# Patient Record
Sex: FEMALE | Race: WHITE | Employment: FULL TIME | URBAN - METROPOLITAN AREA
[De-identification: names, ages, dates, MRNs, and addresses within clinical notes are randomized per-mention and may not be internally consistent; named-entity substitution may affect disease eponyms.]

---

## 2018-01-16 ENCOUNTER — HOSPITAL ENCOUNTER (OUTPATIENT)
Dept: GENERAL RADIOLOGY | Age: 48
Discharge: HOME | End: 2018-01-16
Attending: SURGERY
Payer: COMMERCIAL

## 2018-01-16 ENCOUNTER — HOSPITAL ENCOUNTER (OUTPATIENT)
Dept: PREADMISSION TESTING | Age: 48
Discharge: HOME | End: 2018-01-16
Payer: COMMERCIAL

## 2018-01-16 VITALS
TEMPERATURE: 98.2 F | BODY MASS INDEX: 31.28 KG/M2 | OXYGEN SATURATION: 97 % | WEIGHT: 183.2 LBS | SYSTOLIC BLOOD PRESSURE: 127 MMHG | HEIGHT: 64 IN | HEART RATE: 88 BPM | RESPIRATION RATE: 20 BRPM | DIASTOLIC BLOOD PRESSURE: 82 MMHG

## 2018-01-16 LAB
ABO + RH BLD: NORMAL
ALBUMIN SERPL-MCNC: 3.3 G/DL (ref 3.5–5)
ALBUMIN/GLOB SERPL: 0.8 {RATIO} (ref 1.1–2.2)
ALP SERPL-CCNC: 77 U/L (ref 45–117)
ALT SERPL-CCNC: 26 U/L (ref 12–78)
ANION GAP SERPL CALC-SCNC: 6 MMOL/L (ref 5–15)
APPEARANCE UR: ABNORMAL
APTT PPP: 33.1 SEC (ref 22.1–32.5)
AST SERPL-CCNC: 18 U/L (ref 15–37)
BACTERIA URNS QL MICRO: ABNORMAL /HPF
BILIRUB SERPL-MCNC: 0.5 MG/DL (ref 0.2–1)
BILIRUB UR QL: NEGATIVE
BLOOD GROUP ANTIBODIES SERPL: NORMAL
BUN SERPL-MCNC: 14 MG/DL (ref 6–20)
BUN/CREAT SERPL: 14 (ref 12–20)
CALCIUM SERPL-MCNC: 9 MG/DL (ref 8.5–10.1)
CHLORIDE SERPL-SCNC: 98 MMOL/L (ref 97–108)
CO2 SERPL-SCNC: 32 MMOL/L (ref 21–32)
COLOR UR: ABNORMAL
CREAT SERPL-MCNC: 1 MG/DL (ref 0.55–1.02)
EPITH CASTS URNS QL MICRO: ABNORMAL /LPF
ERYTHROCYTE [DISTWIDTH] IN BLOOD BY AUTOMATED COUNT: 12.8 % (ref 11.5–14.5)
GLOBULIN SER CALC-MCNC: 3.9 G/DL (ref 2–4)
GLUCOSE SERPL-MCNC: 91 MG/DL (ref 65–100)
GLUCOSE UR STRIP.AUTO-MCNC: NEGATIVE MG/DL
HCT VFR BLD AUTO: 47.3 % (ref 35–47)
HGB BLD-MCNC: 16.5 G/DL (ref 11.5–16)
HGB UR QL STRIP: NEGATIVE
INR PPP: 1 (ref 0.9–1.1)
KETONES UR QL STRIP.AUTO: NEGATIVE MG/DL
LEUKOCYTE ESTERASE UR QL STRIP.AUTO: NEGATIVE
MCH RBC QN AUTO: 35.3 PG (ref 26–34)
MCHC RBC AUTO-ENTMCNC: 34.9 G/DL (ref 30–36.5)
MCV RBC AUTO: 101.1 FL (ref 80–99)
NITRITE UR QL STRIP.AUTO: NEGATIVE
PH UR STRIP: 7 [PH] (ref 5–8)
PLATELET # BLD AUTO: 256 K/UL (ref 150–400)
POTASSIUM SERPL-SCNC: 2.9 MMOL/L (ref 3.5–5.1)
PROT SERPL-MCNC: 7.2 G/DL (ref 6.4–8.2)
PROT UR STRIP-MCNC: NEGATIVE MG/DL
PROTHROMBIN TIME: 10 SEC (ref 9–11.1)
RBC # BLD AUTO: 4.68 M/UL (ref 3.8–5.2)
RBC #/AREA URNS HPF: ABNORMAL /HPF (ref 0–5)
SODIUM SERPL-SCNC: 136 MMOL/L (ref 136–145)
SP GR UR REFRACTOMETRY: 1.02 (ref 1–1.03)
SPECIMEN EXP DATE BLD: NORMAL
THERAPEUTIC RANGE,PTTT: ABNORMAL SECS (ref 58–77)
UA: UC IF INDICATED,UAUC: ABNORMAL
UROBILINOGEN UR QL STRIP.AUTO: 2 EU/DL (ref 0.2–1)
WBC # BLD AUTO: 10 K/UL (ref 3.6–11)
WBC URNS QL MICRO: ABNORMAL /HPF (ref 0–4)
YEAST URNS QL MICRO: PRESENT

## 2018-01-16 PROCEDURE — 87086 URINE CULTURE/COLONY COUNT: CPT | Performed by: SURGERY

## 2018-01-16 PROCEDURE — 85730 THROMBOPLASTIN TIME PARTIAL: CPT | Performed by: SURGERY

## 2018-01-16 PROCEDURE — 85610 PROTHROMBIN TIME: CPT | Performed by: SURGERY

## 2018-01-16 PROCEDURE — 80053 COMPREHEN METABOLIC PANEL: CPT | Performed by: SURGERY

## 2018-01-16 PROCEDURE — 81001 URINALYSIS AUTO W/SCOPE: CPT | Performed by: SURGERY

## 2018-01-16 PROCEDURE — 93005 ELECTROCARDIOGRAM TRACING: CPT

## 2018-01-16 PROCEDURE — 86850 RBC ANTIBODY SCREEN: CPT | Performed by: SURGERY

## 2018-01-16 PROCEDURE — 71046 X-RAY EXAM CHEST 2 VIEWS: CPT

## 2018-01-16 PROCEDURE — 36415 COLL VENOUS BLD VENIPUNCTURE: CPT | Performed by: SURGERY

## 2018-01-16 PROCEDURE — 85027 COMPLETE CBC AUTOMATED: CPT | Performed by: SURGERY

## 2018-01-16 RX ORDER — CLOPIDOGREL BISULFATE 75 MG/1
75 TABLET ORAL DAILY
COMMUNITY

## 2018-01-16 RX ORDER — CEFAZOLIN SODIUM 1 G/3ML
2 INJECTION, POWDER, FOR SOLUTION INTRAMUSCULAR; INTRAVENOUS ONCE
Status: CANCELLED | OUTPATIENT
Start: 2018-01-22 | End: 2018-01-22

## 2018-01-16 RX ORDER — ATENOLOL AND CHLORTHALIDONE TABLET 100; 25 MG/1; MG/1
1 TABLET ORAL DAILY
COMMUNITY
End: 2018-01-26

## 2018-01-16 NOTE — PERIOP NOTES
[facility]  Preoperative Instructions        Surgery Date 1/22/19          Time of Arrival 0830 Contact #[phone]    1. On the day of your surgery, please report to the Surgical Services Registration Desk and sign in at your designated time. The Surgery Center is located to the right of the Emergency Room. 2. You must have someone with you to drive you home. You should not drive a car for 24 hours following surgery. Please make arrangements for a friend or family member to stay with you for the first 24 hours after your surgery. 3. Do not have anything to eat or drink (including water, gum, mints, coffee, juice) after midnight 1/21/18  . ? This may not apply to medications prescribed by your physician. ?(Please note below the special instructions with medications to take the morning of your procedure.)    4. We recommend you do not drink any alcoholic beverages for 24 hours before and after your surgery. 5. Contact your surgeons office for instructions on the following medications: non-steroidal anti-inflammatory drugs (i.e. Advil, Aleve), vitamins, and supplements. (Some surgeons will want you to stop these medications prior to surgery and others may allow you to take them)  **If you are currently taking Plavix, Coumadin, Aspirin and/or other blood-thinning agents, contact your surgeon for instructions. ** Your surgeon will partner with the physician prescribing these medications to determine if it is safe to stop or if you need to continue taking. Please do not stop taking these medications without instructions from your surgeon    6. Wear comfortable clothes. Wear glasses instead of contacts. Do not bring any money or jewelry. Please bring picture ID, insurance card, and any prearranged co-payment or hospital payment. Do not wear make-up, particularly mascara the morning of your surgery. Do not wear nail polish, particularly if you are having foot /hand surgery.   Wear your hair loose or down, no ponytails, buns, nico pins or clips. All body piercings must be removed. Please shower with antibacterial soap for three consecutive days before and on the morning of surgery, but do not apply any lotions, powders or deodorants after the shower on the day of surgery. Please use a fresh towels after each shower. Please sleep in clean clothes and change bed linens the night before surgery. Please do not shave for 48 hours prior to surgery. Shaving of the face is acceptable. 7. You should understand that if you do not follow these instructions your surgery may be cancelled. If your physical condition changes (I.e. fever, cold or flu) please contact your surgeon as soon as possible. 8. It is important that you be on time. If a situation occurs where you may be late, please call [phone] (OR Holding Area). 9. If you have any questions and or problems, please call [phone] (Pre-admission Testing). 10. Your surgery time may be subject to change. You will receive a phone call the evening prior if your time changes. 11.  If having outpatient surgery, you must have someone to drive you here, stay with you during the duration of your stay, and to drive you home at time of discharge. 12.   In an effort to improve the efficiency, privacy, and safety for all of our Pre-op patients visitors are not allowed in the Holding area. Once you arrive and are registered your family/visitors will be asked to remain in the waiting room. The Pre-op staff will get you from the Surgical Waiting Area and will explain to you and your family/visitors that the Pre-op phase is beginning. The staff will answer any questions and provide instructions for tracking of the patient, by use of the existing tracking number and color-coded status board in the waiting room.   At this time the staff will also ask for your designated spokesperson information in the event that the physician or staff need to provide an update or obtain any pertinent information. The designated spokesperson will be notified if the physician needs to speak to family during the pre-operative phase. If at any time your family/visitors has questions or concerns they may approach the volunteer desk in the waiting area for assistance. Special Instructions: none     MEDICATIONS TO TAKE THE MORNING OF SURGERY WITH A SIP OF WATER: none       I understand a pre-operative phone call will be made to verify my surgery time. In the event that I am not available, I give permission for a message to be left on my answering service and/or with another person?   yes       ___________________      __________   _________    (Signature of Patient)             (Witness)                (Date and Time)

## 2018-01-16 NOTE — PERIOP NOTES
1744Called Dr. Tete Resendez office regarding Low K ( 2.9) waiting a return call from Dr. Yandel Butterfield. [address] Yandel Butterfield called back and stated Bo Bell NP could deal with the low K tomorrow in the AM.     Bo Bell NP made aware by RADHA Jones RN

## 2018-01-16 NOTE — PERIOP NOTES
Incentive Spirometer        Using the incentive spirometer helps expand the small air sacs of your lungs, helps you breathe deeply, and helps improve your lung function. Use your incentive spirometer twice a day (10 breaths each time) prior to surgery. How to Use Your Incentive Spirometer:  1. Hold the incentive spirometer in an upright position. 2. Breathe out as usual.   3. Place the mouthpiece in your mouth and seal your lips tightly around it. 4. Take a deep breath. Breathe in slowly and as deeply as possible. Keep the blue flow rate guide between the arrows. 5. Hold your breath as long as possible. Then exhale slowly and allow the piston to fall to the bottom of the column. 6. Rest for a few seconds and repeat steps one through five at least 10 times. PAT Tidal Volume_______2500___________  x______5__________  Date______1/16/18_________________    Alena Reyes THE INCENTIVE SPIROMETER WITH YOU TO THE HOSPITAL ON THE DAY OF YOUR SURGERY. Opportunity given to ask and answer questions as well as to observe return demonstration.     Patient signature_____________________________    Witness____________________________

## 2018-01-17 LAB
ATRIAL RATE: 88 BPM
CALCULATED P AXIS, ECG09: 50 DEGREES
CALCULATED R AXIS, ECG10: -66 DEGREES
CALCULATED T AXIS, ECG11: 48 DEGREES
DIAGNOSIS, 93000: NORMAL
P-R INTERVAL, ECG05: 124 MS
Q-T INTERVAL, ECG07: 374 MS
QRS DURATION, ECG06: 88 MS
QTC CALCULATION (BEZET), ECG08: 452 MS
VENTRICULAR RATE, ECG03: 88 BPM

## 2018-01-17 RX ORDER — POTASSIUM CHLORIDE 750 MG/1
20 TABLET, FILM COATED, EXTENDED RELEASE ORAL 2 TIMES DAILY
COMMUNITY
Start: 2018-01-17

## 2018-01-17 NOTE — ADVANCED PRACTICE NURSE
Results from PAT assessment faxed to PCP office with instructions to call with treatment plan for pt and date that K will be rechecked prior to surgery.

## 2018-01-17 NOTE — ADVANCED PRACTICE NURSE
PC from [facility], nurse for Dr. Shanna Pham who states pt will be prescribed KCL 20 mEq BID and recheck her potassium prior to surgery. Will fax results to PAT as soon as available. Chastity to contact pt directly regarding results and need for tx.

## 2018-01-17 NOTE — ADVANCED PRACTICE NURSE
PC from [facility], nurse for Dr. Luisa Mcdermott requesting K+ be rechecked on DOS as pt is just starting potassium supplementation today and will not be able to have repeat K+ level and results prior to pt's surgery on Monday. Will recheck POC chem 8 DOS.

## 2018-01-17 NOTE — ADVANCED PRACTICE NURSE
PC to pt regarding K+ and need for FU with PCP after discharge from hospital as surgeon will not be following her maintenance medications. Pt verbalized understanding and states she will make appt for FU with PCP after DC. Pt had questions regarding Plavix which was started on Monday by Dr. Cabrera Sun. Referred pt to Dr. Regina Fuentes office for directions regarding Plavix. Pt states she will call office. VM left for Ezra, nurse for Dr. Cabrera Sun regarding K+ level, tx as well as pt's concerns regarding Plavix therapy.

## 2018-01-18 LAB
BACTERIA SPEC CULT: NORMAL
CC UR VC: NORMAL
SERVICE CMNT-IMP: NORMAL

## 2018-01-22 ENCOUNTER — ANESTHESIA EVENT (OUTPATIENT)
Dept: SURGERY | Age: 48
DRG: 253 | End: 2018-01-22
Payer: COMMERCIAL

## 2018-01-22 ENCOUNTER — HOSPITAL ENCOUNTER (INPATIENT)
Age: 48
LOS: 4 days | Discharge: HOME | DRG: 253 | End: 2018-01-26
Attending: SURGERY | Admitting: SURGERY
Payer: COMMERCIAL

## 2018-01-22 ENCOUNTER — ANESTHESIA (OUTPATIENT)
Dept: SURGERY | Age: 48
DRG: 253 | End: 2018-01-22
Payer: COMMERCIAL

## 2018-01-22 DIAGNOSIS — I73.9 PVD (PERIPHERAL VASCULAR DISEASE) (HCC): Primary | ICD-10-CM

## 2018-01-22 LAB
ANION GAP BLD CALC-SCNC: 18 MMOL/L (ref 5–15)
BUN BLD-MCNC: 12 MG/DL (ref 9–20)
CA-I BLD-MCNC: 1.11 MMOL/L (ref 1.12–1.32)
CHLORIDE BLD-SCNC: 98 MMOL/L (ref 98–107)
CO2 BLD-SCNC: 26 MMOL/L (ref 21–32)
CREAT BLD-MCNC: 0.9 MG/DL (ref 0.6–1.3)
GLUCOSE BLD-MCNC: 106 MG/DL (ref 65–100)
HCG UR QL: NEGATIVE
HCT VFR BLD CALC: 47 % (ref 35–47)
HGB BLD-MCNC: 16 GM/DL (ref 11.5–16)
POTASSIUM BLD-SCNC: 3.2 MMOL/L (ref 3.5–5.1)
SERVICE CMNT-IMP: ABNORMAL
SODIUM BLD-SCNC: 137 MMOL/L (ref 136–145)

## 2018-01-22 PROCEDURE — 77030002986 HC SUT PROL J&J -A: Performed by: SURGERY

## 2018-01-22 PROCEDURE — 74011250636 HC RX REV CODE- 250/636: Performed by: ANESTHESIOLOGY

## 2018-01-22 PROCEDURE — 76060000034 HC ANESTHESIA 1.5 TO 2 HR: Performed by: SURGERY

## 2018-01-22 PROCEDURE — 74011250637 HC RX REV CODE- 250/637: Performed by: SURGERY

## 2018-01-22 PROCEDURE — 65660000000 HC RM CCU STEPDOWN

## 2018-01-22 PROCEDURE — 74011000272 HC RX REV CODE- 272: Performed by: SURGERY

## 2018-01-22 PROCEDURE — 74011250636 HC RX REV CODE- 250/636

## 2018-01-22 PROCEDURE — 81025 URINE PREGNANCY TEST: CPT

## 2018-01-22 PROCEDURE — 77030031139 HC SUT VCRL2 J&J -A: Performed by: SURGERY

## 2018-01-22 PROCEDURE — 74011250636 HC RX REV CODE- 250/636: Performed by: SURGERY

## 2018-01-22 PROCEDURE — 77030026438 HC STYL ET INTUB CARD -A: Performed by: NURSE ANESTHETIST, CERTIFIED REGISTERED

## 2018-01-22 PROCEDURE — 76010000162 HC OR TIME 1.5 TO 2 HR INTENSV-TIER 1: Performed by: SURGERY

## 2018-01-22 PROCEDURE — 74011000250 HC RX REV CODE- 250

## 2018-01-22 PROCEDURE — 77030019908 HC STETH ESOPH SIMS -A: Performed by: NURSE ANESTHETIST, CERTIFIED REGISTERED

## 2018-01-22 PROCEDURE — 041L0JL BYPASS LEFT FEMORAL ARTERY TO POPLITEAL ARTERY WITH SYNTHETIC SUBSTITUTE, OPEN APPROACH: ICD-10-PCS | Performed by: SURGERY

## 2018-01-22 PROCEDURE — 80047 BASIC METABLC PNL IONIZED CA: CPT

## 2018-01-22 PROCEDURE — 77030020256 HC SOL INJ NACL 0.9%  500ML: Performed by: SURGERY

## 2018-01-22 PROCEDURE — 76210000010 HC REC RM PH I 7 TO 7.5 HR: Performed by: SURGERY

## 2018-01-22 PROCEDURE — 74011000250 HC RX REV CODE- 250: Performed by: SURGERY

## 2018-01-22 PROCEDURE — 77030002916 HC SUT ETHLN J&J -A: Performed by: SURGERY

## 2018-01-22 PROCEDURE — 77030010516 HC APPL HEMA CLP TELE -B: Performed by: SURGERY

## 2018-01-22 PROCEDURE — 77030011640 HC PAD GRND REM COVD -A: Performed by: SURGERY

## 2018-01-22 PROCEDURE — 77030008684 HC TU ET CUF COVD -B: Performed by: NURSE ANESTHETIST, CERTIFIED REGISTERED

## 2018-01-22 PROCEDURE — 77030020782 HC GWN BAIR PAWS FLX 3M -B

## 2018-01-22 PROCEDURE — 74011258636 HC RX REV CODE- 258/636: Performed by: SURGERY

## 2018-01-22 PROCEDURE — 77030034849: Performed by: SURGERY

## 2018-01-22 PROCEDURE — 77030018836 HC SOL IRR NACL ICUM -A: Performed by: SURGERY

## 2018-01-22 PROCEDURE — C1768 GRAFT, VASCULAR: HCPCS | Performed by: SURGERY

## 2018-01-22 PROCEDURE — 77030002924 HC SUT GORTX WLGO -B: Performed by: SURGERY

## 2018-01-22 RX ORDER — ROCURONIUM BROMIDE 10 MG/ML
INJECTION, SOLUTION INTRAVENOUS AS NEEDED
Status: DISCONTINUED | OUTPATIENT
Start: 2018-01-22 | End: 2018-01-22 | Stop reason: HOSPADM

## 2018-01-22 RX ORDER — ONDANSETRON 2 MG/ML
4 INJECTION INTRAMUSCULAR; INTRAVENOUS AS NEEDED
Status: DISCONTINUED | OUTPATIENT
Start: 2018-01-22 | End: 2018-01-22 | Stop reason: HOSPADM

## 2018-01-22 RX ORDER — CLOPIDOGREL BISULFATE 75 MG/1
75 TABLET ORAL DAILY
Status: DISCONTINUED | OUTPATIENT
Start: 2018-01-23 | End: 2018-01-23

## 2018-01-22 RX ORDER — PROPOFOL 10 MG/ML
INJECTION, EMULSION INTRAVENOUS AS NEEDED
Status: DISCONTINUED | OUTPATIENT
Start: 2018-01-22 | End: 2018-01-22 | Stop reason: HOSPADM

## 2018-01-22 RX ORDER — CEFAZOLIN SODIUM/WATER 2 G/20 ML
2 SYRINGE (ML) INTRAVENOUS EVERY 8 HOURS
Status: COMPLETED | OUTPATIENT
Start: 2018-01-22 | End: 2018-01-23

## 2018-01-22 RX ORDER — FENTANYL CITRATE 50 UG/ML
50 INJECTION, SOLUTION INTRAMUSCULAR; INTRAVENOUS AS NEEDED
Status: DISCONTINUED | OUTPATIENT
Start: 2018-01-22 | End: 2018-01-22 | Stop reason: HOSPADM

## 2018-01-22 RX ORDER — SUCCINYLCHOLINE CHLORIDE 20 MG/ML
INJECTION INTRAMUSCULAR; INTRAVENOUS AS NEEDED
Status: DISCONTINUED | OUTPATIENT
Start: 2018-01-22 | End: 2018-01-22 | Stop reason: HOSPADM

## 2018-01-22 RX ORDER — DEXTROSE, SODIUM CHLORIDE, SODIUM LACTATE, POTASSIUM CHLORIDE, AND CALCIUM CHLORIDE 5; .6; .31; .03; .02 G/100ML; G/100ML; G/100ML; G/100ML; G/100ML
125 INJECTION, SOLUTION INTRAVENOUS CONTINUOUS
Status: DISCONTINUED | OUTPATIENT
Start: 2018-01-22 | End: 2018-01-22

## 2018-01-22 RX ORDER — OXYCODONE AND ACETAMINOPHEN 5; 325 MG/1; MG/1
1 TABLET ORAL AS NEEDED
Status: DISCONTINUED | OUTPATIENT
Start: 2018-01-22 | End: 2018-01-22 | Stop reason: HOSPADM

## 2018-01-22 RX ORDER — MORPHINE SULFATE 10 MG/ML
2 INJECTION, SOLUTION INTRAMUSCULAR; INTRAVENOUS
Status: DISCONTINUED | OUTPATIENT
Start: 2018-01-22 | End: 2018-01-22 | Stop reason: HOSPADM

## 2018-01-22 RX ORDER — ONDANSETRON 2 MG/ML
INJECTION INTRAMUSCULAR; INTRAVENOUS AS NEEDED
Status: DISCONTINUED | OUTPATIENT
Start: 2018-01-22 | End: 2018-01-22 | Stop reason: HOSPADM

## 2018-01-22 RX ORDER — ENOXAPARIN SODIUM 100 MG/ML
40 INJECTION SUBCUTANEOUS EVERY 24 HOURS
Status: DISCONTINUED | OUTPATIENT
Start: 2018-01-22 | End: 2018-01-23

## 2018-01-22 RX ORDER — HYDROMORPHONE HYDROCHLORIDE 2 MG/ML
INJECTION, SOLUTION INTRAMUSCULAR; INTRAVENOUS; SUBCUTANEOUS AS NEEDED
Status: DISCONTINUED | OUTPATIENT
Start: 2018-01-22 | End: 2018-01-22 | Stop reason: HOSPADM

## 2018-01-22 RX ORDER — THROMBIN, TOPICAL (BOVINE) 20000 UNIT
KIT TOPICAL AS NEEDED
Status: DISCONTINUED | OUTPATIENT
Start: 2018-01-22 | End: 2018-01-22 | Stop reason: HOSPADM

## 2018-01-22 RX ORDER — SODIUM CHLORIDE 0.9 % (FLUSH) 0.9 %
5-10 SYRINGE (ML) INJECTION AS NEEDED
Status: DISCONTINUED | OUTPATIENT
Start: 2018-01-22 | End: 2018-01-22 | Stop reason: HOSPADM

## 2018-01-22 RX ORDER — SODIUM CHLORIDE, SODIUM LACTATE, POTASSIUM CHLORIDE, CALCIUM CHLORIDE 600; 310; 30; 20 MG/100ML; MG/100ML; MG/100ML; MG/100ML
25 INJECTION, SOLUTION INTRAVENOUS CONTINUOUS
Status: DISCONTINUED | OUTPATIENT
Start: 2018-01-22 | End: 2018-01-23

## 2018-01-22 RX ORDER — FENTANYL CITRATE 50 UG/ML
25 INJECTION, SOLUTION INTRAMUSCULAR; INTRAVENOUS
Status: COMPLETED | OUTPATIENT
Start: 2018-01-22 | End: 2018-01-22

## 2018-01-22 RX ORDER — DIPHENHYDRAMINE HYDROCHLORIDE 50 MG/ML
12.5 INJECTION, SOLUTION INTRAMUSCULAR; INTRAVENOUS AS NEEDED
Status: DISCONTINUED | OUTPATIENT
Start: 2018-01-22 | End: 2018-01-22 | Stop reason: HOSPADM

## 2018-01-22 RX ORDER — SODIUM CHLORIDE 0.9 % (FLUSH) 0.9 %
5-10 SYRINGE (ML) INJECTION EVERY 8 HOURS
Status: DISCONTINUED | OUTPATIENT
Start: 2018-01-22 | End: 2018-01-22 | Stop reason: HOSPADM

## 2018-01-22 RX ORDER — ONDANSETRON 2 MG/ML
4 INJECTION INTRAMUSCULAR; INTRAVENOUS
Status: DISCONTINUED | OUTPATIENT
Start: 2018-01-22 | End: 2018-01-25

## 2018-01-22 RX ORDER — SODIUM CHLORIDE 9 MG/ML
25 INJECTION, SOLUTION INTRAVENOUS CONTINUOUS
Status: DISCONTINUED | OUTPATIENT
Start: 2018-01-22 | End: 2018-01-22 | Stop reason: HOSPADM

## 2018-01-22 RX ORDER — SODIUM CHLORIDE, SODIUM LACTATE, POTASSIUM CHLORIDE, CALCIUM CHLORIDE 600; 310; 30; 20 MG/100ML; MG/100ML; MG/100ML; MG/100ML
INJECTION, SOLUTION INTRAVENOUS
Status: DISCONTINUED | OUTPATIENT
Start: 2018-01-22 | End: 2018-01-22 | Stop reason: HOSPADM

## 2018-01-22 RX ORDER — CILOSTAZOL 100 MG/1
100 TABLET ORAL EVERY 12 HOURS
Status: DISCONTINUED | OUTPATIENT
Start: 2018-01-22 | End: 2018-01-23

## 2018-01-22 RX ORDER — MIDAZOLAM HYDROCHLORIDE 1 MG/ML
0.5 INJECTION, SOLUTION INTRAMUSCULAR; INTRAVENOUS
Status: DISCONTINUED | OUTPATIENT
Start: 2018-01-22 | End: 2018-01-22 | Stop reason: HOSPADM

## 2018-01-22 RX ORDER — METOPROLOL TARTRATE 5 MG/5ML
INJECTION INTRAVENOUS AS NEEDED
Status: DISCONTINUED | OUTPATIENT
Start: 2018-01-22 | End: 2018-01-22 | Stop reason: HOSPADM

## 2018-01-22 RX ORDER — DEXTROSE, SODIUM CHLORIDE, SODIUM LACTATE, POTASSIUM CHLORIDE, AND CALCIUM CHLORIDE 5; .6; .31; .03; .02 G/100ML; G/100ML; G/100ML; G/100ML; G/100ML
75 INJECTION, SOLUTION INTRAVENOUS CONTINUOUS
Status: DISCONTINUED | OUTPATIENT
Start: 2018-01-22 | End: 2018-01-23

## 2018-01-22 RX ORDER — CEFAZOLIN SODIUM/WATER 2 G/20 ML
2 SYRINGE (ML) INTRAVENOUS ONCE
Status: DISCONTINUED | OUTPATIENT
Start: 2018-01-22 | End: 2018-01-22 | Stop reason: HOSPADM

## 2018-01-22 RX ORDER — CEFAZOLIN SODIUM IN 0.9 % NACL 2 G/100 ML
PLASTIC BAG, INJECTION (ML) INTRAVENOUS AS NEEDED
Status: DISCONTINUED | OUTPATIENT
Start: 2018-01-22 | End: 2018-01-22 | Stop reason: HOSPADM

## 2018-01-22 RX ORDER — SODIUM CHLORIDE 0.9 % (FLUSH) 0.9 %
SYRINGE (ML) INJECTION
Status: COMPLETED
Start: 2018-01-22 | End: 2018-01-22

## 2018-01-22 RX ORDER — FENTANYL CITRATE 50 UG/ML
INJECTION, SOLUTION INTRAMUSCULAR; INTRAVENOUS AS NEEDED
Status: DISCONTINUED | OUTPATIENT
Start: 2018-01-22 | End: 2018-01-22 | Stop reason: HOSPADM

## 2018-01-22 RX ORDER — NEOSTIGMINE METHYLSULFATE 1 MG/ML
INJECTION INTRAVENOUS AS NEEDED
Status: DISCONTINUED | OUTPATIENT
Start: 2018-01-22 | End: 2018-01-22 | Stop reason: HOSPADM

## 2018-01-22 RX ORDER — HYDROMORPHONE HYDROCHLORIDE 2 MG/ML
0.2 INJECTION, SOLUTION INTRAMUSCULAR; INTRAVENOUS; SUBCUTANEOUS
Status: COMPLETED | OUTPATIENT
Start: 2018-01-22 | End: 2018-01-22

## 2018-01-22 RX ORDER — SODIUM CHLORIDE, SODIUM LACTATE, POTASSIUM CHLORIDE, CALCIUM CHLORIDE 600; 310; 30; 20 MG/100ML; MG/100ML; MG/100ML; MG/100ML
125 INJECTION, SOLUTION INTRAVENOUS CONTINUOUS
Status: DISCONTINUED | OUTPATIENT
Start: 2018-01-22 | End: 2018-01-22

## 2018-01-22 RX ORDER — HEPARIN SODIUM 5000 [USP'U]/ML
INJECTION, SOLUTION INTRAVENOUS; SUBCUTANEOUS AS NEEDED
Status: DISCONTINUED | OUTPATIENT
Start: 2018-01-22 | End: 2018-01-22 | Stop reason: HOSPADM

## 2018-01-22 RX ORDER — DEXAMETHASONE SODIUM PHOSPHATE 4 MG/ML
INJECTION, SOLUTION INTRA-ARTICULAR; INTRALESIONAL; INTRAMUSCULAR; INTRAVENOUS; SOFT TISSUE AS NEEDED
Status: DISCONTINUED | OUTPATIENT
Start: 2018-01-22 | End: 2018-01-22 | Stop reason: HOSPADM

## 2018-01-22 RX ORDER — FENTANYL CITRATE 50 UG/ML
INJECTION, SOLUTION INTRAMUSCULAR; INTRAVENOUS
Status: COMPLETED
Start: 2018-01-22 | End: 2018-01-22

## 2018-01-22 RX ORDER — SODIUM CHLORIDE, SODIUM LACTATE, POTASSIUM CHLORIDE, CALCIUM CHLORIDE 600; 310; 30; 20 MG/100ML; MG/100ML; MG/100ML; MG/100ML
125 INJECTION, SOLUTION INTRAVENOUS CONTINUOUS
Status: DISCONTINUED | OUTPATIENT
Start: 2018-01-22 | End: 2018-01-22 | Stop reason: HOSPADM

## 2018-01-22 RX ORDER — GLYCOPYRROLATE 0.2 MG/ML
INJECTION INTRAMUSCULAR; INTRAVENOUS AS NEEDED
Status: DISCONTINUED | OUTPATIENT
Start: 2018-01-22 | End: 2018-01-22 | Stop reason: HOSPADM

## 2018-01-22 RX ORDER — PROTAMINE SULFATE 10 MG/ML
10 INJECTION, SOLUTION INTRAVENOUS
Status: COMPLETED | OUTPATIENT
Start: 2018-01-22 | End: 2018-01-22

## 2018-01-22 RX ORDER — MIDAZOLAM HYDROCHLORIDE 1 MG/ML
1 INJECTION, SOLUTION INTRAMUSCULAR; INTRAVENOUS AS NEEDED
Status: DISCONTINUED | OUTPATIENT
Start: 2018-01-22 | End: 2018-01-22 | Stop reason: HOSPADM

## 2018-01-22 RX ORDER — LIDOCAINE HYDROCHLORIDE 10 MG/ML
0.1 INJECTION, SOLUTION EPIDURAL; INFILTRATION; INTRACAUDAL; PERINEURAL AS NEEDED
Status: DISCONTINUED | OUTPATIENT
Start: 2018-01-22 | End: 2018-01-22 | Stop reason: HOSPADM

## 2018-01-22 RX ORDER — MIDAZOLAM HYDROCHLORIDE 1 MG/ML
INJECTION, SOLUTION INTRAMUSCULAR; INTRAVENOUS AS NEEDED
Status: DISCONTINUED | OUTPATIENT
Start: 2018-01-22 | End: 2018-01-22 | Stop reason: HOSPADM

## 2018-01-22 RX ORDER — MORPHINE SULFATE IN 0.9 % NACL 150MG/30ML
PATIENT CONTROLLED ANALGESIA SYRINGE INTRAVENOUS
Status: DISCONTINUED | OUTPATIENT
Start: 2018-01-22 | End: 2018-01-26 | Stop reason: HOSPADM

## 2018-01-22 RX ORDER — LIDOCAINE HYDROCHLORIDE 20 MG/ML
INJECTION, SOLUTION EPIDURAL; INFILTRATION; INTRACAUDAL; PERINEURAL AS NEEDED
Status: DISCONTINUED | OUTPATIENT
Start: 2018-01-22 | End: 2018-01-22 | Stop reason: HOSPADM

## 2018-01-22 RX ADMIN — SODIUM CHLORIDE, SODIUM LACTATE, POTASSIUM CHLORIDE, AND CALCIUM CHLORIDE 25 ML/HR: 600; 310; 30; 20 INJECTION, SOLUTION INTRAVENOUS at 09:45

## 2018-01-22 RX ADMIN — METOPROLOL TARTRATE 2 MG: 5 INJECTION INTRAVENOUS at 12:30

## 2018-01-22 RX ADMIN — HYDROMORPHONE HYDROCHLORIDE 0.5 MG: 2 INJECTION, SOLUTION INTRAMUSCULAR; INTRAVENOUS; SUBCUTANEOUS at 11:46

## 2018-01-22 RX ADMIN — FENTANYL CITRATE 25 MCG: 50 INJECTION, SOLUTION INTRAMUSCULAR; INTRAVENOUS at 12:50

## 2018-01-22 RX ADMIN — MORPHINE SULFATE 2 MG: 10 INJECTION INTRAMUSCULAR; INTRAVENOUS; SUBCUTANEOUS at 19:00

## 2018-01-22 RX ADMIN — FENTANYL CITRATE 50 MCG: 50 INJECTION, SOLUTION INTRAMUSCULAR; INTRAVENOUS at 10:54

## 2018-01-22 RX ADMIN — HYDROMORPHONE HYDROCHLORIDE 0.2 MG: 2 INJECTION, SOLUTION INTRAMUSCULAR; INTRAVENOUS; SUBCUTANEOUS at 14:00

## 2018-01-22 RX ADMIN — Medication 10 ML: at 14:00

## 2018-01-22 RX ADMIN — SUCCINYLCHOLINE CHLORIDE 140 MG: 20 INJECTION INTRAMUSCULAR; INTRAVENOUS at 10:47

## 2018-01-22 RX ADMIN — ONDANSETRON 4 MG: 2 INJECTION INTRAMUSCULAR; INTRAVENOUS at 12:11

## 2018-01-22 RX ADMIN — SODIUM CHLORIDE, SODIUM LACTATE, POTASSIUM CHLORIDE, CALCIUM CHLORIDE, AND DEXTROSE MONOHYDRATE 75 ML/HR: 600; 310; 30; 20; 5 INJECTION, SOLUTION INTRAVENOUS at 14:35

## 2018-01-22 RX ADMIN — PROPOFOL 50 MG: 10 INJECTION, EMULSION INTRAVENOUS at 10:51

## 2018-01-22 RX ADMIN — FENTANYL CITRATE 100 MCG: 50 INJECTION, SOLUTION INTRAMUSCULAR; INTRAVENOUS at 10:47

## 2018-01-22 RX ADMIN — Medication 2 G: at 19:07

## 2018-01-22 RX ADMIN — FENTANYL CITRATE 25 MCG: 50 INJECTION, SOLUTION INTRAMUSCULAR; INTRAVENOUS at 12:55

## 2018-01-22 RX ADMIN — METOPROLOL TARTRATE 1 MG: 5 INJECTION INTRAVENOUS at 11:19

## 2018-01-22 RX ADMIN — METOPROLOL TARTRATE 1 MG: 5 INJECTION INTRAVENOUS at 11:09

## 2018-01-22 RX ADMIN — LIDOCAINE HYDROCHLORIDE 80 MG: 20 INJECTION, SOLUTION EPIDURAL; INFILTRATION; INTRACAUDAL; PERINEURAL at 10:47

## 2018-01-22 RX ADMIN — Medication 2 G: at 10:52

## 2018-01-22 RX ADMIN — Medication: at 20:13

## 2018-01-22 RX ADMIN — SODIUM CHLORIDE, SODIUM LACTATE, POTASSIUM CHLORIDE, AND CALCIUM CHLORIDE 125 ML/HR: 600; 310; 30; 20 INJECTION, SOLUTION INTRAVENOUS at 14:05

## 2018-01-22 RX ADMIN — NITROGLYCERIN 1 INCH: 20 OINTMENT TOPICAL at 19:06

## 2018-01-22 RX ADMIN — FENTANYL CITRATE 25 MCG: 50 INJECTION, SOLUTION INTRAMUSCULAR; INTRAVENOUS at 12:45

## 2018-01-22 RX ADMIN — MIDAZOLAM HYDROCHLORIDE 2 MG: 1 INJECTION, SOLUTION INTRAMUSCULAR; INTRAVENOUS at 10:42

## 2018-01-22 RX ADMIN — GLYCOPYRROLATE 0.5 MG: 0.2 INJECTION INTRAMUSCULAR; INTRAVENOUS at 12:16

## 2018-01-22 RX ADMIN — SODIUM CHLORIDE, SODIUM LACTATE, POTASSIUM CHLORIDE, CALCIUM CHLORIDE: 600; 310; 30; 20 INJECTION, SOLUTION INTRAVENOUS at 10:52

## 2018-01-22 RX ADMIN — FENTANYL CITRATE 25 MCG: 50 INJECTION, SOLUTION INTRAMUSCULAR; INTRAVENOUS at 13:00

## 2018-01-22 RX ADMIN — HEPARIN SODIUM 5000 UNITS: 5000 INJECTION, SOLUTION INTRAVENOUS; SUBCUTANEOUS at 11:23

## 2018-01-22 RX ADMIN — DEXAMETHASONE SODIUM PHOSPHATE 8 MG: 4 INJECTION, SOLUTION INTRA-ARTICULAR; INTRALESIONAL; INTRAMUSCULAR; INTRAVENOUS; SOFT TISSUE at 11:00

## 2018-01-22 RX ADMIN — HYDROMORPHONE HYDROCHLORIDE 0.2 MG: 2 INJECTION, SOLUTION INTRAMUSCULAR; INTRAVENOUS; SUBCUTANEOUS at 14:15

## 2018-01-22 RX ADMIN — ROCURONIUM BROMIDE 5 MG: 10 INJECTION, SOLUTION INTRAVENOUS at 10:47

## 2018-01-22 RX ADMIN — SODIUM CHLORIDE, SODIUM LACTATE, POTASSIUM CHLORIDE, CALCIUM CHLORIDE, AND DEXTROSE MONOHYDRATE 75 ML/HR: 600; 310; 30; 20; 5 INJECTION, SOLUTION INTRAVENOUS at 20:11

## 2018-01-22 RX ADMIN — Medication: at 19:07

## 2018-01-22 RX ADMIN — NEOSTIGMINE METHYLSULFATE 3 MG: 1 INJECTION INTRAVENOUS at 12:16

## 2018-01-22 RX ADMIN — FENTANYL CITRATE 50 MCG: 50 INJECTION, SOLUTION INTRAMUSCULAR; INTRAVENOUS at 11:09

## 2018-01-22 RX ADMIN — ENOXAPARIN SODIUM 40 MG: 40 INJECTION SUBCUTANEOUS at 21:32

## 2018-01-22 RX ADMIN — NITROGLYCERIN 1 INCH: 20 OINTMENT TOPICAL at 23:13

## 2018-01-22 RX ADMIN — FENTANYL CITRATE 50 MCG: 50 INJECTION, SOLUTION INTRAMUSCULAR; INTRAVENOUS at 11:06

## 2018-01-22 RX ADMIN — PROPOFOL 160 MG: 10 INJECTION, EMULSION INTRAVENOUS at 10:47

## 2018-01-22 RX ADMIN — PROTAMINE SULFATE 10 MG: 10 INJECTION, SOLUTION INTRAVENOUS at 13:27

## 2018-01-22 NOTE — PERIOP NOTES
isak- right and left temporal pulse palp. Smile and tongue pertrussion symmetrical, right and left radial pulse palp.  grasp strong and equal, brisk cap refill, right and left dp and pt pulse palp. Strong foot pushes brisk cap refill . Pt alert and oriented.

## 2018-01-22 NOTE — ANESTHESIA POSTPROCEDURE EVALUATION
Post-Anesthesia Evaluation and Assessment    Patient: Bj Lopez MRN: [id number]  SSN: [id number]    YOB: 1970  Age: 56 y.o. Sex: female       Cardiovascular Function/Vital Signs  Visit Vitals    BP (!) 151/102    Pulse (!) 101    Temp 37 °C (98.6 °F)    Resp 14    Ht 5' 4\" (1.626 m)    Wt 81.3 kg (179 lb 3.7 oz)    SpO2 98%    BMI 30.77 kg/m2       Patient is status post general anesthesia for Procedure(s):  LEFT FEMORAL-POPLITEAL BYPASS PTFE . Nausea/Vomiting: None    Postoperative hydration reviewed and adequate. Pain:  Pain Scale 1: (P) Numeric (0 - 10) (01/22/18 1230)  Pain Intensity 1: (P) 1 (01/22/18 1230)   Managed    Neurological Status:   Neuro (WDL): (P) Within Defined Limits (01/22/18 1230)   At baseline    Mental Status and Level of Consciousness: Arousable    Pulmonary Status:   O2 Device: (P) Nasal cannula (01/22/18 1230)   Adequate oxygenation and airway patent    Complications related to anesthesia: None    Post-anesthesia assessment completed.  No concerns    Signed By: Hoang Madison DO     January 22, 2018

## 2018-01-22 NOTE — PERIOP NOTES
Dr. Carlos Davies called to clarify which IVF he would like infusing on patient and updated on left groin bleeding. Dr. Mendoza Sayfaisal ordered LR at 125mL/hr which is currently infusing in the PACU. Physician made aware of very scant bleeding on 4x4 from left groin incision, bleeding has nearly stopped, vital signs, pain, and IVF currently infusing. Dr. Carlos Davies requesting D5LR at 75mL/hr.

## 2018-01-22 NOTE — OP NOTES
[facility] OP NOTE    Irene Castro  MR#: [id number]  : 1970  ACCOUNT #: [de-identified]   DATE OF SERVICE: 2018    PREOPERATIVE DIAGNOSIS:  Peripheral vascular disease. POSTOPERATIVE DIAGNOSIS:  Peripheral vascular disease. OPERATIONS:  Left femoral to above knee popliteal bypass graft utilizing 6 mm thin wall PTFE. SURGEON:  Jed Vasquez. ANESTHESIA:  General.     ESTIMATED BLOOD LOSS:  50 mL. IMPLANTS:  PTFE graft. SPECIMENS:  None. COMPLICATIONS:  None. PROCEDURE:  With the patient supine on the operating table, general anesthesia was induced and the left leg prepared as a sterile field. A skin incision was made over the thigh distally and medially where the above-knee popliteal artery was identified and isolated. It was small but patent. An oblique incision was then made in the groin where the common femoral and along its branches were identified and isolated. The patient had previously had a stent placed in the proximal profunda extending into the common femoral.  I could feel this through the artery. There was a lot of inflammation in the groin. The patient was systemically heparinized. The popliteal artery was occluded and opened longitudinally. A 6 mm PTFE graft was selected and anastomosed end-to-side the popliteal with a running simple suture of 5-0 PTFE. Release of the vascular occlusion clamps resulted in excellent backbleeding. The graft was heparinized. The graft was brought through a subcutaneous tunnel to lie in the groin. Here, the distal external iliac and the distal common femoral were occluded and an arteriotomy was made. The stent was not manipulated. The graft was appropriately fashioned and anastomosed end-to-side to this arteriotomy with a running simple suture of 5-0 PTFE. Release of the vascular occlusion clamps resulted in excellent pulse and Doppler signal in the system. Heparinization was not reversed. The wounds were examined for hemostasis, which was considered adequate. The subcutaneous tissue closed with 2-0 Vicryl and skin with subcuticular suture of 3-0 Vicryl. Flexible collodion was applied at the patient returned to recovery in guarded condition.       MD BRAN Watson / [place]  D: 01/22/2018 12:49     T: 01/22/2018 15:37  JOB #: [id number]  CC: Praneeth Laws MD  CC: Barbie Guerrero MD

## 2018-01-22 NOTE — PERIOP NOTES
Pt with small amount of sang. Bleeding laterally and the femoral site. Dr. Paige Waddell at bedside to assess at [phone], orders for Protamine Sulfate 46SY now and application of ice above the knee and below the femoral site. Pharmacy sent a message for Protamine and called to confirm recepeit of order. Pharmacy stating they will tube the medication.

## 2018-01-22 NOTE — PERIOP NOTES
Pt tolerated ginger ale, crackers, an peanut butter without problems. VSS, pain now controlled, denies problems or n/v.  Pt's boyfriend, Myrna Robert, updated again and informed how well [address] [place] [address] is doing and that we are awaiting PCU bed assignment. Pt and Jaziel both verbalizing an understanding. All questions answered.

## 2018-01-22 NOTE — PERIOP NOTES
Dr. Gonzalez Speak aware of k reading 3.2 no orders given. Spoke with room nurse  Whether to apply mepilex sacrum border she said to apply it. Applied and documented.

## 2018-01-22 NOTE — ANESTHESIA PREPROCEDURE EVALUATION
Anesthetic History               Review of Systems / Medical History  Patient summary reviewed, nursing notes reviewed and pertinent labs reviewed    Pulmonary  Within defined limits                 Neuro/Psych   Within defined limits           Cardiovascular    Hypertension          PAD         GI/Hepatic/Renal  Within defined limits              Endo/Other  Within defined limits           Other Findings   Comments: Hypokalemia, K 2.9            Physical Exam    Airway  Mallampati: II  TM Distance: > 6 cm  Neck ROM: normal range of motion   Mouth opening: Normal     Cardiovascular  Regular rate and rhythm,  S1 and S2 normal,  no murmur, click, rub, or gallop             Dental  No notable dental hx       Pulmonary  Breath sounds clear to auscultation               Abdominal  GI exam deferred       Other Findings            Anesthetic Plan    ASA: 3  Anesthesia type: general          Induction: Intravenous  Anesthetic plan and risks discussed with: Patient      Rechecking PRIMO

## 2018-01-22 NOTE — PERIOP NOTES
Protamine sulfate provided as per orders at 1327. Very minimal bleeding even slower now, nearly completely stopped. Fresh 4x4 provided to determine if completely stopped. Some edema at left medial knee incision site but is not becoming more edematous, ice remains present above knee site as per MD orders. Pt stating left groin pain starting to feel better after pain interventions of repositioning, ice, and pain medication. Pt's boyfriend, Maribell Poole, updated as to pt's status, plan of care, and awaiting room assignment. Maribell Poole reports that he has no questions at this time.

## 2018-01-22 NOTE — IP AVS SNAPSHOT
[address] [place] [address] 
[phone] Patient: Federico Chi MRN: [id number] RRQ:90/22/4602 About your hospitalization You were admitted on:  January 22, 2018 You last received care in the:  [facility] PROGRESSIVE CARE You were discharged on:  January 26, 2018 Why you were hospitalized Your primary diagnosis was:  Not on File Your diagnoses also included:  Pvd (Peripheral Vascular Disease) (Hcc) Follow-up Information Follow up With Details Comments Contact Info Lashanda Gamez MD  Your appointment is scheduled for Feb 2nd at [place] [address] [address] [place] [address] 
[phone] Penny Serra MD In 3 weeks Your appointment is scheduled for Feb 15th at 0900am at the [facility] 16 office--[id number] [place] [address] 
[phone] Discharge Orders None A check rosendo indicates which time of day the medication should be taken. My Medications START taking these medications Instructions Each Dose to Equal  
 Morning Noon Evening Bedtime  
 atenolol 100 mg tablet Commonly known as:  TENORMIN Your last dose was: Your next dose is: Take 1 Tab by mouth daily. 100 mg  
    
   
   
   
  
 naproxen 250 mg tablet Commonly known as:  NAPROSYN Your last dose was: Your next dose is: Take 1 Tab by mouth two (2) times daily (with meals). 250 mg  
    
   
   
   
  
 nicotine 14 mg/24 hr patch Commonly known as:  Chelsea Clam Your last dose was: Your next dose is:    
   
   
 1 Patch by TransDERmal route every twenty-four (24) hours for 30 days. 1 Patch  
    
   
   
   
  
 oxyCODONE-acetaminophen 5-325 mg per tablet Commonly known as:  PERCOCET Your last dose was: Your next dose is: Take 1-2 Tabs by mouth every four (4) hours as needed. Max Daily Amount: 12 Tabs. 1-2 Tab  
    
   
   
   
  
 senna 8.6 mg tablet Commonly known as:  Parker Renae Your last dose was: Your next dose is: Take 2 Tabs by mouth nightly as needed for Constipation. 2 Tab  
    
   
   
   
  
 trimethoprim-sulfamethoxazole 160-800 mg per tablet Commonly known as:  BACTRIM DS, SEPTRA DS Your last dose was: Your next dose is: Take 1 Tab by mouth every twelve (12) hours. 1 Tab CONTINUE taking these medications Instructions Each Dose to Equal  
 Morning Noon Evening Bedtime PLAVIX 75 mg Tab Generic drug:  clopidogrel Your last dose was: Your next dose is: Take 75 mg by mouth daily. 75 mg PLETAL PO Your last dose was: Your next dose is: Take  by mouth.  
     
   
   
   
  
 potassium chloride SR 10 mEq tablet Commonly known as:  KLOR-CON 10 Your last dose was: Your next dose is: Take 20 mEq by mouth two (2) times a day. 20 mEq STOP taking these medications   
 atenolol-chlorthalidone 100-25 mg per tablet Commonly known as:  Lazarus Colace Where to Get Your Medications Information on where to get these meds will be given to you by the nurse or doctor. ! Ask your nurse or doctor about these medications  
  naproxen 250 mg tablet  
 nicotine 14 mg/24 hr patch  
 oxyCODONE-acetaminophen 5-325 mg per tablet  
 senna 8.6 mg tablet  
 trimethoprim-sulfamethoxazole 160-800 mg per tablet Discharge Instructions None [facility] Announcement We are excited to announce that we are making your provider's discharge notes available to you in [facility].   You will see these notes when they are completed and signed by the physician that discharged you from your recent hospital stay. If you have any questions or concerns about any information you see in [employer], please call the Health Information Department where you were seen or reach out to your Primary Care Provider for more information about your plan of care. Introducing [facility]! [facility] introduces [employer] patient portal. Now you can access parts of your medical record, email your doctor's office, and request medication refills online. 1. In your internet browser, go to [email or web address] 2. Click on the First Time User? Click Here link in the Sign In box. You will see the New Member Sign Up page. 3. Enter your [employer] Access Code exactly as it appears below. You will not need to use this code after youve completed the sign-up process. If you do not sign up before the expiration date, you must request a new code. · [employer] Access Code: [email or web address] Expires: 4/16/2018  3:56 PM 
 
4. Enter the last four digits of your Social Security Number (xxxx) and Date of Birth (mm/dd/yyyy) as indicated and click Submit. You will be taken to the next sign-up page. 5. Create a [employer] ID. This will be your [employer] login ID and cannot be changed, so think of one that is secure and easy to remember. 6. Create a [employer] password. You can change your password at any time. 7. Enter your Password Reset Question and Answer. This can be used at a later time if you forget your password. 8. Enter your e-mail address. You will receive e-mail notification when new information is available in [address] 9. Click Sign Up. You can now view and download portions of your medical record. 10. Click the Download Summary menu link to download a portable copy of your medical information.  
 
If you have questions, please visit the Frequently Asked Questions section of the [employer]. Remember, MyChart is NOT to be used for urgent needs. For medical emergencies, dial 911. Now available from your iPhone and Android! Providers Seen During Your Hospitalization Provider Specialty Primary office phone Arthur Moralez MD Vascular Surgery [phone] Your Primary Care Physician (PCP) Primary Care Physician Office Phone Office Fax Melinda Stanley [phone] You are allergic to the following Allergen Reactions Tape (Adhesive) Other (comments) Possibly tape burn  To groin on the right Recent Documentation Height Weight BMI OB Status Smoking Status 1.626 m 81.3 kg 30.77 kg/m2 IUD Current Every Day Smoker Emergency Contacts Name Discharge Info Relation Home Work Mobile Vivek Bui DISCHARGE CAREGIVER [3] Brother [24]   [phone] Patient Belongings The following personal items are in your possession at time of discharge: 
  Dental Appliances: None  Visual Aid: None   Hearing Aids/Status: Does not own  Home Medications: None   Jewelry: None  Clothing: None    Other Valuables: None  Personal Items Sent to Safe: No valuables brought to Pre Op Please provide this summary of care documentation to your next provider. Signatures-by signing, you are acknowledging that this After Visit Summary has been reviewed with you and you have received a copy. Patient Signature:  ____________________________________________________________ Date:  ____________________________________________________________  
  
Pipo Can Provider Signature:  ____________________________________________________________ Date:  ____________________________________________________________

## 2018-01-22 NOTE — BRIEF OP NOTE
BRIEF OPERATIVE NOTE    Date of Procedure: 1/22/2018   Preoperative Diagnosis: BILATERAL ASO WITH CLAUDICATION   Postoperative Diagnosis: BILATERAL ASO WITH CLAUDICATION     Procedure(s):  LEFT FEMORAL-POPLITEAL BYPASS PTFE   Surgeon(s) and Role:     * ES Ellington MD - Primary         Assistant Staff:       Surgical Staff:  Circ-1: Perfecto Rainey RN  Circ-Relief: Saintclair Infante, RN  Scrub Tech-1: Eve Vaughn  Scrub Tech-Relief: Simi Frias  Surg Asst-1: Vira Johnston  Event Time In   Incision Start 1104   Incision Close 1215     Anesthesia: General   Estimated Blood Loss: 50cc  Specimens: * No specimens in log *   Findings: pvd   Complications: 0  Implants:   Implant Name Type Inv.  Item Serial No.  Lot No. LRB No. Used Action   GRAFT VASC TW [id number] -- [employer] - [id number]   GRAFT VASC TW [id number] -- [employer] [id number]  [employer] [id number] Left 1 Implanted

## 2018-01-22 NOTE — PERIOP NOTES
Pt without further left medial knee edema, also no further bleeding on left groin incision. Pt reports feeling less pressure in that area after readjusting bed to elevate left leg higher. VSS. Continue to await room assignment and cleaning.

## 2018-01-22 NOTE — PERIOP NOTES
Handoff Report from Operating Room to PACU    Report received from Dodie Hudson RN and Loretta Long CRNA regarding Frances Tam. Surgeon(s):  ES Lantigua MD  And Procedure(s) (LRB):  LEFT FEMORAL-POPLITEAL BYPASS PTFE  (Left)  confirmed   with allergies, drains and dressings discussed. Anesthesia type, drugs, patient history, complications, estimated blood loss, vital signs, intake and output, and last pain medication, lines, reversal medications and temperature were reviewed.

## 2018-01-22 NOTE — IP AVS SNAPSHOT
[address] 
[phone] Patient: Evert Vincent MRN: [id number] LLD:[id number] A check rosendo indicates which time of day the medication should be taken. My Medications START taking these medications Instructions Each Dose to Equal  
 Morning Noon Evening Bedtime  
 atenolol 100 mg tablet Commonly known as:  TENORMIN Your last dose was: Your next dose is: Take 1 Tab by mouth daily. 100 mg  
    
   
   
   
  
 naproxen 250 mg tablet Commonly known as:  NAPROSYN Your last dose was: Your next dose is: Take 1 Tab by mouth two (2) times daily (with meals). 250 mg  
    
   
   
   
  
 nicotine 14 mg/24 hr patch Commonly known as:  Noni Sharpe Your last dose was: Your next dose is:    
   
   
 1 Patch by TransDERmal route every twenty-four (24) hours for 30 days. 1 Patch  
    
   
   
   
  
 oxyCODONE-acetaminophen 5-325 mg per tablet Commonly known as:  PERCOCET Your last dose was: Your next dose is: Take 1-2 Tabs by mouth every four (4) hours as needed. Max Daily Amount: 12 Tabs. 1-2 Tab  
    
   
   
   
  
 senna 8.6 mg tablet Commonly known as:  Parker Renae Your last dose was: Your next dose is: Take 2 Tabs by mouth nightly as needed for Constipation. 2 Tab  
    
   
   
   
  
 trimethoprim-sulfamethoxazole 160-800 mg per tablet Commonly known as:  BACTRIM DS, SEPTRA DS Your last dose was: Your next dose is: Take 1 Tab by mouth every twelve (12) hours. 1 Tab CONTINUE taking these medications Instructions Each Dose to Equal  
 Morning Noon Evening Bedtime PLAVIX 75 mg Tab Generic drug:  clopidogrel Your last dose was: Your next dose is: Take 75 mg by mouth daily.   
 75 mg  
 PLETAL PO Your last dose was: Your next dose is: Take  by mouth.  
     
   
   
   
  
 potassium chloride SR 10 mEq tablet Commonly known as:  KLOR-CON 10 Your last dose was: Your next dose is: Take 20 mEq by mouth two (2) times a day. 20 mEq STOP taking these medications   
 atenolol-chlorthalidone 100-25 mg per tablet Commonly known as:  Anmol Costa Where to Get Your Medications Information on where to get these meds will be given to you by the nurse or doctor. ! Ask your nurse or doctor about these medications  
  naproxen 250 mg tablet  
 nicotine 14 mg/24 hr patch  
 oxyCODONE-acetaminophen 5-325 mg per tablet  
 senna 8.6 mg tablet  
 trimethoprim-sulfamethoxazole 160-800 mg per tablet

## 2018-01-22 NOTE — PERIOP NOTES
Pt continues to do well. Pt's brother, Elier Hedrick, updated per patient's request.  Pt spoke on the phone with her brother and boyfriend Raymond Wiley. Continue to await room for patient transfer.

## 2018-01-23 ENCOUNTER — ANESTHESIA EVENT (OUTPATIENT)
Dept: SURGERY | Age: 48
DRG: 253 | End: 2018-01-23
Payer: COMMERCIAL

## 2018-01-23 LAB
ALBUMIN SERPL-MCNC: 2.5 G/DL (ref 3.5–5)
ALBUMIN/GLOB SERPL: 0.7 {RATIO} (ref 1.1–2.2)
ALP SERPL-CCNC: 62 U/L (ref 45–117)
ALT SERPL-CCNC: 16 U/L (ref 12–78)
ANION GAP SERPL CALC-SCNC: 6 MMOL/L (ref 5–15)
AST SERPL-CCNC: 17 U/L (ref 15–37)
ATRIAL RATE: 90 BPM
BASOPHILS # BLD: 0 K/UL (ref 0–0.1)
BASOPHILS NFR BLD: 0 % (ref 0–1)
BILIRUB SERPL-MCNC: 0.4 MG/DL (ref 0.2–1)
BUN SERPL-MCNC: 10 MG/DL (ref 6–20)
BUN/CREAT SERPL: 14 (ref 12–20)
CALCIUM SERPL-MCNC: 8.5 MG/DL (ref 8.5–10.1)
CALCULATED P AXIS, ECG09: 66 DEGREES
CALCULATED R AXIS, ECG10: -55 DEGREES
CALCULATED T AXIS, ECG11: 41 DEGREES
CHLORIDE SERPL-SCNC: 101 MMOL/L (ref 97–108)
CO2 SERPL-SCNC: 28 MMOL/L (ref 21–32)
CREAT SERPL-MCNC: 0.71 MG/DL (ref 0.55–1.02)
DIAGNOSIS, 93000: NORMAL
EOSINOPHIL # BLD: 0 K/UL (ref 0–0.4)
EOSINOPHIL NFR BLD: 0 % (ref 0–7)
ERYTHROCYTE [DISTWIDTH] IN BLOOD BY AUTOMATED COUNT: 12.5 % (ref 11.5–14.5)
GLOBULIN SER CALC-MCNC: 3.4 G/DL (ref 2–4)
GLUCOSE SERPL-MCNC: 127 MG/DL (ref 65–100)
HCT VFR BLD AUTO: 36.4 % (ref 35–47)
HGB BLD-MCNC: 12.7 G/DL (ref 11.5–16)
LYMPHOCYTES # BLD: 1.3 K/UL (ref 0.8–3.5)
LYMPHOCYTES NFR BLD: 11 % (ref 12–49)
MCH RBC QN AUTO: 34.3 PG (ref 26–34)
MCHC RBC AUTO-ENTMCNC: 34.9 G/DL (ref 30–36.5)
MCV RBC AUTO: 98.4 FL (ref 80–99)
MONOCYTES # BLD: 1.2 K/UL (ref 0–1)
MONOCYTES NFR BLD: 11 % (ref 5–13)
NEUTS SEG # BLD: 9.2 K/UL (ref 1.8–8)
NEUTS SEG NFR BLD: 78 % (ref 32–75)
P-R INTERVAL, ECG05: 134 MS
PLATELET # BLD AUTO: 279 K/UL (ref 150–400)
POTASSIUM SERPL-SCNC: 3.2 MMOL/L (ref 3.5–5.1)
PROT SERPL-MCNC: 5.9 G/DL (ref 6.4–8.2)
Q-T INTERVAL, ECG07: 388 MS
QRS DURATION, ECG06: 94 MS
QTC CALCULATION (BEZET), ECG08: 474 MS
RBC # BLD AUTO: 3.7 M/UL (ref 3.8–5.2)
SODIUM SERPL-SCNC: 135 MMOL/L (ref 136–145)
VENTRICULAR RATE, ECG03: 90 BPM
WBC # BLD AUTO: 11.7 K/UL (ref 3.6–11)

## 2018-01-23 PROCEDURE — 74011250636 HC RX REV CODE- 250/636: Performed by: NURSE PRACTITIONER

## 2018-01-23 PROCEDURE — 74011258636 HC RX REV CODE- 258/636: Performed by: SURGERY

## 2018-01-23 PROCEDURE — 74011250636 HC RX REV CODE- 250/636: Performed by: SURGERY

## 2018-01-23 PROCEDURE — 74011250637 HC RX REV CODE- 250/637: Performed by: NURSE PRACTITIONER

## 2018-01-23 PROCEDURE — 36415 COLL VENOUS BLD VENIPUNCTURE: CPT | Performed by: SURGERY

## 2018-01-23 PROCEDURE — 74011250637 HC RX REV CODE- 250/637: Performed by: SURGERY

## 2018-01-23 PROCEDURE — 80053 COMPREHEN METABOLIC PANEL: CPT | Performed by: SURGERY

## 2018-01-23 PROCEDURE — 85025 COMPLETE CBC W/AUTO DIFF WBC: CPT | Performed by: SURGERY

## 2018-01-23 PROCEDURE — 65660000000 HC RM CCU STEPDOWN

## 2018-01-23 PROCEDURE — 93005 ELECTROCARDIOGRAM TRACING: CPT

## 2018-01-23 RX ORDER — SENNOSIDES 8.6 MG/1
2 TABLET ORAL
Status: DISCONTINUED | OUTPATIENT
Start: 2018-01-23 | End: 2018-01-26 | Stop reason: HOSPADM

## 2018-01-23 RX ORDER — METOPROLOL TARTRATE 5 MG/5ML
2.5 INJECTION INTRAVENOUS EVERY 6 HOURS
Status: DISCONTINUED | OUTPATIENT
Start: 2018-01-24 | End: 2018-01-25

## 2018-01-23 RX ORDER — OXYCODONE AND ACETAMINOPHEN 5; 325 MG/1; MG/1
1-2 TABLET ORAL
Status: DISCONTINUED | OUTPATIENT
Start: 2018-01-23 | End: 2018-01-26 | Stop reason: HOSPADM

## 2018-01-23 RX ORDER — POTASSIUM CHLORIDE 20 MEQ/1
40 TABLET, EXTENDED RELEASE ORAL ONCE
Status: COMPLETED | OUTPATIENT
Start: 2018-01-23 | End: 2018-01-23

## 2018-01-23 RX ORDER — POTASSIUM CHLORIDE AND SODIUM CHLORIDE 450; 150 MG/100ML; MG/100ML
INJECTION, SOLUTION INTRAVENOUS CONTINUOUS
Status: DISCONTINUED | OUTPATIENT
Start: 2018-01-23 | End: 2018-01-25

## 2018-01-23 RX ADMIN — SODIUM CHLORIDE AND POTASSIUM CHLORIDE: 4.5; 1.49 INJECTION, SOLUTION INTRAVENOUS at 10:07

## 2018-01-23 RX ADMIN — Medication 2 G: at 02:57

## 2018-01-23 RX ADMIN — CHLORTHALIDONE: 25 TABLET ORAL at 08:19

## 2018-01-23 RX ADMIN — SENNOSIDES 17.2 MG: 8.6 TABLET, FILM COATED ORAL at 21:07

## 2018-01-23 RX ADMIN — OXYCODONE HYDROCHLORIDE AND ACETAMINOPHEN 1 TABLET: 5; 325 TABLET ORAL at 10:24

## 2018-01-23 RX ADMIN — SODIUM CHLORIDE AND POTASSIUM CHLORIDE: 4.5; 1.49 INJECTION, SOLUTION INTRAVENOUS at 23:24

## 2018-01-23 RX ADMIN — NITROGLYCERIN 1 INCH: 20 OINTMENT TOPICAL at 18:25

## 2018-01-23 RX ADMIN — POTASSIUM CHLORIDE 40 MEQ: 20 TABLET, EXTENDED RELEASE ORAL at 10:06

## 2018-01-23 RX ADMIN — Medication: at 20:37

## 2018-01-23 RX ADMIN — CLOPIDOGREL BISULFATE 75 MG: 75 TABLET ORAL at 08:19

## 2018-01-23 RX ADMIN — NITROGLYCERIN 1 INCH: 20 OINTMENT TOPICAL at 05:16

## 2018-01-23 RX ADMIN — OXYCODONE HYDROCHLORIDE AND ACETAMINOPHEN 1 TABLET: 5; 325 TABLET ORAL at 14:57

## 2018-01-23 RX ADMIN — NITROGLYCERIN 1 INCH: 20 OINTMENT TOPICAL at 12:10

## 2018-01-23 RX ADMIN — SODIUM CHLORIDE, SODIUM LACTATE, POTASSIUM CHLORIDE, CALCIUM CHLORIDE, AND DEXTROSE MONOHYDRATE 75 ML/HR: 600; 310; 30; 20; 5 INJECTION, SOLUTION INTRAVENOUS at 02:50

## 2018-01-23 NOTE — PROGRESS NOTES
Vascular Surgery Progress Note  Samra No ACNP-BC  1/23/2018       Subjective:     Ms. Vinayak Chu is a 57 yo WF with a pmhx significant for HTN. She was admitted to the hospital s/p left fem to above the knee popliteal bypass w PTFE. This morning she reports moderate pain and significant thigh swelling. She is tachycardic.       Nursing Data:     Patient Vitals for the past 24 hrs:   BP Temp Pulse Resp SpO2   01/23/18 0712 116/79 98.3 °F (36.8 °C) (!) 116 16 98 %   01/23/18 0200 115/83 98.3 °F (36.8 °C) (!) 107 16 94 %   01/23/18 0100 113/69 - (!) 102 - 95 %   01/23/18 0000 116/80 - 99 - 96 %   01/22/18 2300 117/80 - 99 - 95 %   01/22/18 2222 118/79 97.7 °F (36.5 °C) 100 16 98 %   01/22/18 1958 133/84 97.6 °F (36.4 °C) (!) 110 16 98 %   01/22/18 1937 - - (!) 104 16 96 %   01/22/18 1920 - 97.6 °F (36.4 °C) - - -   01/22/18 1906 123/82 - 99 - -   01/22/18 1900 123/82 - (!) 109 21 96 %   01/22/18 1830 124/79 - (!) 105 21 98 %   01/22/18 1800 128/79 - (!) 103 16 98 %   01/22/18 1730 (!) 119/91 - 93 12 97 %   01/22/18 1700 114/83 - (!) 102 27 98 %   01/22/18 1630 122/84 - (!) 101 16 97 %   01/22/18 1600 115/85 - 91 16 97 %   01/22/18 1530 118/75 - 89 16 96 %   01/22/18 1500 118/84 - 100 20 96 %   01/22/18 1430 102/82 - 92 16 96 %   01/22/18 1415 119/77 - 89 17 96 %   01/22/18 1400 122/83 - 90 14 97 %   01/22/18 1345 124/83 - 89 13 96 %   01/22/18 1330 131/87 - 88 18 96 %   01/22/18 1315 128/81 - 87 14 97 %   01/22/18 1300 (!) 144/97 - 88 14 98 %   01/22/18 1259 - - 87 16 98 %   01/22/18 1245 (!) 151/102 - (!) 101 14 98 %   01/22/18 1240 (!) 149/96 - 99 23 99 %   01/22/18 1235 (!) 145/106 - (!) 103 17 97 %   01/22/18 1232 (!) 134/116 98.6 °F (37 °C) (!) 106 16 97 %   01/22/18 1230 (!) 134/116 - - - 96 %   01/22/18 1228 (!) 151/100 - - - -     ---------------------------------------------------------------------------------------------------------    Intake/Output Summary (Last 24 hours) at 01/23/18 6924  Last data filed at 01/23/18 0827   Gross per 24 hour   Intake           2387.5 ml   Output             2100 ml   Net            287.5 ml       Exam:     Physical Exam   Constitutional: She is oriented to person, place, and time. She appears well-developed and well-nourished. HENT:   Head: Normocephalic and atraumatic. Eyes: Pupils are equal, round, and reactive to light. Neck: Normal range of motion. Cardiovascular: Tachycardia present. Pulses:       Femoral pulses are 2+ on the right side, and 2+ on the left side. Left foot warm to the touch. Pulmonary/Chest: Effort normal and breath sounds normal.   Abdominal: Soft. Bowel sounds are normal.   Musculoskeletal: Normal range of motion. Neurological: She is alert and oriented to person, place, and time. Skin:   Incision to the left groin with edema and moderate ecchymosis. Incision to the left inferior thigh with edema and mild ecchymosis. She has decreased sensation at incision site. Psychiatric: Judgment and thought content normal.       Lab Review:     . Recent Results (from the past 24 hour(s))   POC CHEM8    Collection Time: 01/22/18  9:42 AM   Result Value Ref Range    Calcium, ionized (POC) 1.11 (L) 1.12 - 1.32 MMOL/L    Sodium (POC) 137 136 - 145 MMOL/L    Potassium (POC) 3.2 (L) 3.5 - 5.1 MMOL/L    Chloride (POC) 98 98 - 107 MMOL/L    CO2 (POC) 26 21 - 32 MMOL/L    Anion gap (POC) 18 (H) 5 - 15 mmol/L    Glucose (POC) 106 (H) 65 - 100 MG/DL    BUN (POC) 12 9 - 20 MG/DL    Creatinine (POC) 0.9 0.6 - 1.3 MG/DL    GFRAA, POC >60 >60 ml/min/1.73m2    GFRNA, POC >60 >60 ml/min/1.73m2    Hemoglobin (POC) 16.0 11.5 - 16.0 GM/DL    Hematocrit (POC) 47 35.0 - 47.0 %    Comment Comment Not Indicated.      CBC WITH AUTOMATED DIFF    Collection Time: 01/23/18  2:55 AM   Result Value Ref Range    WBC 11.7 (H) 3.6 - 11.0 K/uL    RBC 3.70 (L) 3.80 - 5.20 M/uL    HGB 12.7 11.5 - 16.0 g/dL    HCT 36.4 35.0 - 47.0 %    MCV 98.4 80.0 - 99.0 FL    MCH 34.3 (H) 26.0 - 34.0 PG    MCHC 34.9 30.0 - 36.5 g/dL    RDW 12.5 11.5 - 14.5 %    PLATELET 613 728 - 936 K/uL    NEUTROPHILS 78 (H) 32 - 75 %    LYMPHOCYTES 11 (L) 12 - 49 %    MONOCYTES 11 5 - 13 %    EOSINOPHILS 0 0 - 7 %    BASOPHILS 0 0 - 1 %    ABS. NEUTROPHILS 9.2 (H) 1.8 - 8.0 K/UL    ABS. LYMPHOCYTES 1.3 0.8 - 3.5 K/UL    ABS. MONOCYTES 1.2 (H) 0.0 - 1.0 K/UL    ABS. EOSINOPHILS 0.0 0.0 - 0.4 K/UL    ABS. BASOPHILS 0.0 0.0 - 0.1 K/UL   METABOLIC PANEL, COMPREHENSIVE    Collection Time: 01/23/18  2:55 AM   Result Value Ref Range    Sodium 135 (L) 136 - 145 mmol/L    Potassium 3.2 (L) 3.5 - 5.1 mmol/L    Chloride 101 97 - 108 mmol/L    CO2 28 21 - 32 mmol/L    Anion gap 6 5 - 15 mmol/L    Glucose 127 (H) 65 - 100 mg/dL    BUN 10 6 - 20 MG/DL    Creatinine 0.71 0.55 - 1.02 MG/DL    BUN/Creatinine ratio 14 12 - 20      GFR est AA >60 >60 ml/min/1.73m2    GFR est non-AA >60 >60 ml/min/1.73m2    Calcium 8.5 8.5 - 10.1 MG/DL    Bilirubin, total 0.4 0.2 - 1.0 MG/DL    ALT (SGPT) 16 12 - 78 U/L    AST (SGOT) 17 15 - 37 U/L    Alk. phosphatase 62 45 - 117 U/L    Protein, total 5.9 (L) 6.4 - 8.2 g/dL    Albumin 2.5 (L) 3.5 - 5.0 g/dL    Globulin 3.4 2.0 - 4.0 g/dL    A-G Ratio 0.7 (L) 1.1 - 2.2         XR Results (most recent):    Results from Hospital Encounter encounter on 01/16/18   XR CHEST PA LAT   Narrative EXAM:  XR CHEST PA LAT    INDICATION:   pre-op for left femoral popliteal bypass. Hypertension. COMPARISON: None. FINDINGS: PA and lateral radiographs of the chest demonstrate clear lungs. The  cardiac and mediastinal contours and pulmonary vascularity are normal.  The  bones and soft tissues are within normal limits. Impression IMPRESSION: Normal chest.                     Assessment/Plan:      Principal Problem:  PVD (peripheral vascular disease) s/p left fem to above the knee popliteal bypass w PTFE on 01/23/18  Acute Blood Loss Anemia     Active Problems.     Tachycardia  Hypertension  Hypokalemia H&H has dropped 3.8 points since presentation. It is not at levels to transfuse. Continue to monitor. Hold Plavix, Pletal, and prophylactic LMWH for now. Patient is tachycardic. Continue IVF. Stat EKG to r/o AFib. Likely secondary to blood loss continue to follow. Patient hypertensive overnight. Now controlled. Continue home medication atenolol. Continue PCA. Add PRN Percocet. Add BM regimen. Encourage OOB. Replete potassium orally. VTE prophylaxis:  Prophylactic LMWH held for anemia and ecchymosis  SCDs. Disposition:   Home 1-2 days.

## 2018-01-23 NOTE — PERIOP NOTES
Pt has ate 50% of meal without problems, both her mother and boyfriend have visited at her bedside for support as we wait for an inpatient PCU bed. Pt reports that her pain is somewhat increasing 4/10, dose of IV Morphine 2mg provided (versus Oxycodone PO) as we are awaiting Morphine PCA to be compounded and sent to PACU from pharmacy.

## 2018-01-23 NOTE — PROGRESS NOTES
TRANSFER - IN REPORT:    Verbal report received from [address] RN(name) on Brigitte Padilla  being received from PACU(unit) for routine progression of care      Report consisted of patients Situation, Background, Assessment and   Recommendations(SBAR). Information from the following report(s) SBAR, Kardex, Intake/Output and Recent Results was reviewed with the receiving nurse. Opportunity for questions and clarification was provided. Assessment completed upon patients arrival to unit and care assumed. Primary Nurse Armando Lozada and Meño Casarez, RN performed a dual skin assessment on this patient No impairment noted Except Left groin/medial incision   Tayo score is 21        PCU SHIFT NURSING NOTE        Shift Summary:   2000: Patient arrived via stretcher from PACU. Patient alert and oriented x4. No complaints of pain or discomfort. Education provided on use of PCA button. Education provided on new medications to be administered. 2102: MD paged. Patient stating Pletal gives her headaches. Orders received to hold 2100 dose and pass on to attending in the am       Admission Date 1/22/2018   Admission Diagnosis BILATERAL ASO WITH CLAUDICATION   PVD (peripheral vascular disease) ([address]   Consults IP CONSULT TO HOSPITALIST        Consults   []PT   []OT   []Speech   []Case Management      [] Palliative      Cardiac Monitoring Order   []Yes   []No     IV drips   []Yes    Drip:                            Dose:  Drip:                            Dose:  Drip:                            Dose:   []No     GI Prophylaxis   []Yes   []No         DVT Prophylaxis   SCDs:             Sarbjit stockings:         [] Medication   []Contraindicated   []None      Activity Level           Purposeful Rounding every 1-2 hour?    []Yes   Tapia Score  Total Score: 1   Bed Alarm (If score 3 or >)   []Yes   [] Refused (See signed refusal form in chart)   Tayo Score  Tayo Score: 22   Tayo Score (if score 14 or less)   []PMT consult   []Wound Care consult      []Specialty bed   [] Nutrition consult          Needs prior to discharge:   Home O2 required:    []Yes   []No    If yes, how much O2 required? Other:    Last Bowel Movement: Last Bowel Movement Date: 01/22/18      Influenza Vaccine Received Flu Vaccine for Current Season (usually Sept-March): No        Pneumonia Vaccine           Diet Active Orders   Diet    DIET CARDIAC      LDAs               Peripheral IV 01/22/18 Left Hand (Active)   Site Assessment Clean, dry, & intact 1/22/2018 12:28 PM   Phlebitis Assessment 0 1/22/2018 12:28 PM   Infiltration Assessment 0 1/22/2018 12:28 PM   Dressing Status Clean, dry, & intact 1/22/2018 12:28 PM   Dressing Type Tape;Transparent 1/22/2018 12:28 PM   Hub Color/Line Status Green; Infusing 1/22/2018 12:28 PM       Peripheral IV 01/22/18 Right Hand (Active)   Site Assessment Clean, dry, & intact 1/22/2018 12:28 PM   Phlebitis Assessment 0 1/22/2018 12:28 PM   Infiltration Assessment 0 1/22/2018 12:28 PM   Dressing Status Clean, dry, & intact 1/22/2018 12:28 PM   Dressing Type Tape;Transparent 1/22/2018 12:28 PM   Hub Color/Line Status Green;Capped 1/22/2018 12:28 PM                      Urinary Catheter Urinary Catheter 01/22/18 2- way; Saini-Criteria for Appropriate Use: Surgical procedure    Intake & Output   Date 01/21/18 1900 - 01/22/18 0659(Not Admitted) 01/22/18 0700 - 01/23/18 0659   Shift 1236-6387 24 Hour Total 2568-0507 8917-5054 24 Hour Total   I  N  T  A  K  E   I.V.   1300  (1.3)  1300      Volume (lactated Ringers infusion)   800  800      Volume (lactated Ringers infusion)   500  500    Shift Total  (mL/kg)   1300  (16)  1300  (16)   O  U  T  P  U  T   Urine   50  (0.1) 550 600      Urine Output   50  50      Urine Output (mL) (Urinary Catheter 01/22/18 2- way; Saini)    550 550    Blood   200  200      Estimated Blood Loss   200  200    Shift Total  (mL/kg)   250  (3.1) 550  (6.8) 800  (9.8)   NET   1050 -550 500   Weight (kg) 81.3 81.3 81.3         Readmission Risk Assessment Tool Score Low Risk            2       Total Score        2 . Living with Significant Other. Assisted Living. LTAC. SNF. or   Rehab        Criteria that do not apply:    Has Seen PCP in Last 6 Months (Yes=3, No=0)    Patient Length of Stay (>5 days = 3)    IP Visits Last 12 Months (1-3=4, 4=9, >4=11)    Pt.  Coverage (Medicare=5 , Medicaid, or Self-Pay=4)    Charlson Comorbidity Score (Age + Comorbid Conditions)       Expected Length of Stay - - -   Actual Length of Stay 0

## 2018-01-23 NOTE — PERIOP NOTES
TRANSFER - OUT REPORT:    Verbal report given to Primitivo Luque RN at 1935 on Shannen Mora  being transferred to [facility] [phone] PCU (unit) for routine post - op       Report consisted of patients Situation, Background, Assessment and   Recommendations(SBAR). Information from the following report(s) SBAR, Kardex, OR Summary, Procedure Summary, Intake/Output, MAR and Cardiac Rhythm NSR was reviewed with the receiving nurse. Opportunity for questions and clarification was provided.       Patient transported with:   Monitor  Registered Nurse  Tech x2  Pt's belongings

## 2018-01-23 NOTE — PROGRESS NOTES
PCU SHIFT NURSING NOTE      Bedside and Verbal shift change report given to Rosalba Marina RN (oncoming nurse) by [employer] RN (offgoing nurse). Report included the following information SBAR, Kardex, Procedure Summary, Recent Results, Med Rec Status and Cardiac Rhythm NSR-ST. Shift Summary:   0720: Pt resting comfortably in bed without complaints of pain. Pulses dopplerable, swelling in legs decreasing according to pt, RN, and previous markings. Numbness continued around incisional areas. 1030: Saini removed, morphine PCA stopped, and percocet given at this time. 1130: Ot OOB to bathroom with assistance, voided, then to recliner chair with legs elevated.

## 2018-01-24 ENCOUNTER — ANESTHESIA (OUTPATIENT)
Dept: SURGERY | Age: 48
DRG: 253 | End: 2018-01-24
Payer: COMMERCIAL

## 2018-01-24 LAB
ANION GAP SERPL CALC-SCNC: 7 MMOL/L (ref 5–15)
BASOPHILS # BLD: 0.1 K/UL (ref 0–0.1)
BASOPHILS NFR BLD: 1 % (ref 0–1)
BUN SERPL-MCNC: 6 MG/DL (ref 6–20)
BUN/CREAT SERPL: 11 (ref 12–20)
CALCIUM SERPL-MCNC: 8.4 MG/DL (ref 8.5–10.1)
CHLORIDE SERPL-SCNC: 103 MMOL/L (ref 97–108)
CO2 SERPL-SCNC: 27 MMOL/L (ref 21–32)
CREAT SERPL-MCNC: 0.56 MG/DL (ref 0.55–1.02)
EOSINOPHIL # BLD: 0 K/UL (ref 0–0.4)
EOSINOPHIL NFR BLD: 1 % (ref 0–7)
ERYTHROCYTE [DISTWIDTH] IN BLOOD BY AUTOMATED COUNT: 12.9 % (ref 11.5–14.5)
GLUCOSE SERPL-MCNC: 82 MG/DL (ref 65–100)
HCT VFR BLD AUTO: 35.8 % (ref 35–47)
HGB BLD-MCNC: 11.8 G/DL (ref 11.5–16)
LYMPHOCYTES # BLD: 2 K/UL (ref 0.8–3.5)
LYMPHOCYTES NFR BLD: 26 % (ref 12–49)
MCH RBC QN AUTO: 32.6 PG (ref 26–34)
MCHC RBC AUTO-ENTMCNC: 33 G/DL (ref 30–36.5)
MCV RBC AUTO: 98.9 FL (ref 80–99)
MONOCYTES # BLD: 1.1 K/UL (ref 0–1)
MONOCYTES NFR BLD: 14 % (ref 5–13)
NEUTS SEG # BLD: 4.6 K/UL (ref 1.8–8)
NEUTS SEG NFR BLD: 58 % (ref 32–75)
PLATELET # BLD AUTO: 249 K/UL (ref 150–400)
POTASSIUM SERPL-SCNC: 3.5 MMOL/L (ref 3.5–5.1)
RBC # BLD AUTO: 3.62 M/UL (ref 3.8–5.2)
SODIUM SERPL-SCNC: 137 MMOL/L (ref 136–145)
WBC # BLD AUTO: 7.9 K/UL (ref 3.6–11)

## 2018-01-24 PROCEDURE — 80048 BASIC METABOLIC PNL TOTAL CA: CPT | Performed by: NURSE PRACTITIONER

## 2018-01-24 PROCEDURE — 65660000000 HC RM CCU STEPDOWN

## 2018-01-24 PROCEDURE — 74011250636 HC RX REV CODE- 250/636

## 2018-01-24 PROCEDURE — 77030020782 HC GWN BAIR PAWS FLX 3M -B

## 2018-01-24 PROCEDURE — 74011000272 HC RX REV CODE- 272: Performed by: SURGERY

## 2018-01-24 PROCEDURE — 74011250636 HC RX REV CODE- 250/636: Performed by: SURGERY

## 2018-01-24 PROCEDURE — 77030002924 HC SUT GORTX WLGO -B: Performed by: SURGERY

## 2018-01-24 PROCEDURE — 77030010507 HC ADH SKN DERMBND J&J -B: Performed by: SURGERY

## 2018-01-24 PROCEDURE — 77030026438 HC STYL ET INTUB CARD -A: Performed by: NURSE ANESTHETIST, CERTIFIED REGISTERED

## 2018-01-24 PROCEDURE — 74011250636 HC RX REV CODE- 250/636: Performed by: NURSE PRACTITIONER

## 2018-01-24 PROCEDURE — 76210000006 HC OR PH I REC 0.5 TO 1 HR: Performed by: SURGERY

## 2018-01-24 PROCEDURE — 74011250637 HC RX REV CODE- 250/637: Performed by: NURSE PRACTITIONER

## 2018-01-24 PROCEDURE — 76060000034 HC ANESTHESIA 1.5 TO 2 HR: Performed by: SURGERY

## 2018-01-24 PROCEDURE — 77030034849: Performed by: SURGERY

## 2018-01-24 PROCEDURE — 77030032490 HC SLV COMPR SCD KNE COVD -B

## 2018-01-24 PROCEDURE — 77030008684 HC TU ET CUF COVD -B: Performed by: NURSE ANESTHETIST, CERTIFIED REGISTERED

## 2018-01-24 PROCEDURE — 74011250636 HC RX REV CODE- 250/636: Performed by: ANESTHESIOLOGY

## 2018-01-24 PROCEDURE — 041K0JL BYPASS RIGHT FEMORAL ARTERY TO POPLITEAL ARTERY WITH SYNTHETIC SUBSTITUTE, OPEN APPROACH: ICD-10-PCS | Performed by: SURGERY

## 2018-01-24 PROCEDURE — 74011000250 HC RX REV CODE- 250: Performed by: SURGERY

## 2018-01-24 PROCEDURE — 77030031139 HC SUT VCRL2 J&J -A: Performed by: SURGERY

## 2018-01-24 PROCEDURE — 85025 COMPLETE CBC W/AUTO DIFF WBC: CPT | Performed by: NURSE PRACTITIONER

## 2018-01-24 PROCEDURE — 76010000162 HC OR TIME 1.5 TO 2 HR INTENSV-TIER 1: Performed by: SURGERY

## 2018-01-24 PROCEDURE — 77030018836 HC SOL IRR NACL ICUM -A: Performed by: SURGERY

## 2018-01-24 PROCEDURE — 77030014008 HC SPNG HEMSTAT J&J -C: Performed by: SURGERY

## 2018-01-24 PROCEDURE — 36415 COLL VENOUS BLD VENIPUNCTURE: CPT | Performed by: NURSE PRACTITIONER

## 2018-01-24 PROCEDURE — 77030011640 HC PAD GRND REM COVD -A: Performed by: SURGERY

## 2018-01-24 PROCEDURE — 77030020256 HC SOL INJ NACL 0.9%  500ML: Performed by: SURGERY

## 2018-01-24 PROCEDURE — 74011000250 HC RX REV CODE- 250

## 2018-01-24 PROCEDURE — 74011000250 HC RX REV CODE- 250: Performed by: INTERNAL MEDICINE

## 2018-01-24 PROCEDURE — C1768 GRAFT, VASCULAR: HCPCS | Performed by: SURGERY

## 2018-01-24 DEVICE — VG THIN WALL 6MM X 50CM LINED
Type: IMPLANTABLE DEVICE | Site: LEG | Status: FUNCTIONAL
Brand: GORE-TEX   VASCULAR GRAFT

## 2018-01-24 RX ORDER — HEPARIN SODIUM 5000 [USP'U]/ML
INJECTION, SOLUTION INTRAVENOUS; SUBCUTANEOUS AS NEEDED
Status: DISCONTINUED | OUTPATIENT
Start: 2018-01-24 | End: 2018-01-24 | Stop reason: HOSPADM

## 2018-01-24 RX ORDER — FENTANYL CITRATE 50 UG/ML
25 INJECTION, SOLUTION INTRAMUSCULAR; INTRAVENOUS
Status: DISCONTINUED | OUTPATIENT
Start: 2018-01-24 | End: 2018-01-24 | Stop reason: HOSPADM

## 2018-01-24 RX ORDER — LIDOCAINE HYDROCHLORIDE 10 MG/ML
0.1 INJECTION, SOLUTION EPIDURAL; INFILTRATION; INTRACAUDAL; PERINEURAL AS NEEDED
Status: DISCONTINUED | OUTPATIENT
Start: 2018-01-24 | End: 2018-01-24 | Stop reason: HOSPADM

## 2018-01-24 RX ORDER — METOPROLOL TARTRATE 5 MG/5ML
INJECTION INTRAVENOUS AS NEEDED
Status: DISCONTINUED | OUTPATIENT
Start: 2018-01-24 | End: 2018-01-24 | Stop reason: HOSPADM

## 2018-01-24 RX ORDER — HYDROMORPHONE HYDROCHLORIDE 2 MG/ML
INJECTION, SOLUTION INTRAMUSCULAR; INTRAVENOUS; SUBCUTANEOUS AS NEEDED
Status: DISCONTINUED | OUTPATIENT
Start: 2018-01-24 | End: 2018-01-24 | Stop reason: HOSPADM

## 2018-01-24 RX ORDER — ONDANSETRON 2 MG/ML
INJECTION INTRAMUSCULAR; INTRAVENOUS AS NEEDED
Status: DISCONTINUED | OUTPATIENT
Start: 2018-01-24 | End: 2018-01-24 | Stop reason: HOSPADM

## 2018-01-24 RX ORDER — DIPHENHYDRAMINE HYDROCHLORIDE 50 MG/ML
12.5 INJECTION, SOLUTION INTRAMUSCULAR; INTRAVENOUS AS NEEDED
Status: DISCONTINUED | OUTPATIENT
Start: 2018-01-24 | End: 2018-01-24 | Stop reason: HOSPADM

## 2018-01-24 RX ORDER — SODIUM CHLORIDE 0.9 % (FLUSH) 0.9 %
5-10 SYRINGE (ML) INJECTION AS NEEDED
Status: DISCONTINUED | OUTPATIENT
Start: 2018-01-24 | End: 2018-01-24 | Stop reason: HOSPADM

## 2018-01-24 RX ORDER — MIDAZOLAM HYDROCHLORIDE 1 MG/ML
1 INJECTION, SOLUTION INTRAMUSCULAR; INTRAVENOUS AS NEEDED
Status: DISCONTINUED | OUTPATIENT
Start: 2018-01-24 | End: 2018-01-24 | Stop reason: HOSPADM

## 2018-01-24 RX ORDER — ROCURONIUM BROMIDE 10 MG/ML
INJECTION, SOLUTION INTRAVENOUS AS NEEDED
Status: DISCONTINUED | OUTPATIENT
Start: 2018-01-24 | End: 2018-01-24 | Stop reason: HOSPADM

## 2018-01-24 RX ORDER — ONDANSETRON 2 MG/ML
4 INJECTION INTRAMUSCULAR; INTRAVENOUS AS NEEDED
Status: DISCONTINUED | OUTPATIENT
Start: 2018-01-24 | End: 2018-01-26 | Stop reason: HOSPADM

## 2018-01-24 RX ORDER — SODIUM CHLORIDE 9 MG/ML
50 INJECTION, SOLUTION INTRAVENOUS CONTINUOUS
Status: DISCONTINUED | OUTPATIENT
Start: 2018-01-24 | End: 2018-01-24 | Stop reason: HOSPADM

## 2018-01-24 RX ORDER — MORPHINE SULFATE 10 MG/ML
2 INJECTION, SOLUTION INTRAMUSCULAR; INTRAVENOUS
Status: DISCONTINUED | OUTPATIENT
Start: 2018-01-24 | End: 2018-01-24 | Stop reason: HOSPADM

## 2018-01-24 RX ORDER — SODIUM CHLORIDE, SODIUM LACTATE, POTASSIUM CHLORIDE, CALCIUM CHLORIDE 600; 310; 30; 20 MG/100ML; MG/100ML; MG/100ML; MG/100ML
75 INJECTION, SOLUTION INTRAVENOUS CONTINUOUS
Status: DISCONTINUED | OUTPATIENT
Start: 2018-01-24 | End: 2018-01-24 | Stop reason: HOSPADM

## 2018-01-24 RX ORDER — HYDROMORPHONE HYDROCHLORIDE 2 MG/ML
0.2 INJECTION, SOLUTION INTRAMUSCULAR; INTRAVENOUS; SUBCUTANEOUS
Status: DISCONTINUED | OUTPATIENT
Start: 2018-01-24 | End: 2018-01-24 | Stop reason: HOSPADM

## 2018-01-24 RX ORDER — PROPOFOL 10 MG/ML
INJECTION, EMULSION INTRAVENOUS AS NEEDED
Status: DISCONTINUED | OUTPATIENT
Start: 2018-01-24 | End: 2018-01-24 | Stop reason: HOSPADM

## 2018-01-24 RX ORDER — CEFAZOLIN SODIUM IN 0.9 % NACL 2 G/100 ML
PLASTIC BAG, INJECTION (ML) INTRAVENOUS AS NEEDED
Status: DISCONTINUED | OUTPATIENT
Start: 2018-01-24 | End: 2018-01-24 | Stop reason: HOSPADM

## 2018-01-24 RX ORDER — MIDAZOLAM HYDROCHLORIDE 1 MG/ML
INJECTION, SOLUTION INTRAMUSCULAR; INTRAVENOUS AS NEEDED
Status: DISCONTINUED | OUTPATIENT
Start: 2018-01-24 | End: 2018-01-24 | Stop reason: HOSPADM

## 2018-01-24 RX ORDER — DEXAMETHASONE SODIUM PHOSPHATE 4 MG/ML
INJECTION, SOLUTION INTRA-ARTICULAR; INTRALESIONAL; INTRAMUSCULAR; INTRAVENOUS; SOFT TISSUE AS NEEDED
Status: DISCONTINUED | OUTPATIENT
Start: 2018-01-24 | End: 2018-01-24 | Stop reason: HOSPADM

## 2018-01-24 RX ORDER — MIDAZOLAM HYDROCHLORIDE 1 MG/ML
0.5 INJECTION, SOLUTION INTRAMUSCULAR; INTRAVENOUS
Status: DISCONTINUED | OUTPATIENT
Start: 2018-01-24 | End: 2018-01-24 | Stop reason: HOSPADM

## 2018-01-24 RX ORDER — GLYCOPYRROLATE 0.2 MG/ML
INJECTION INTRAMUSCULAR; INTRAVENOUS AS NEEDED
Status: DISCONTINUED | OUTPATIENT
Start: 2018-01-24 | End: 2018-01-24 | Stop reason: HOSPADM

## 2018-01-24 RX ORDER — FENTANYL CITRATE 50 UG/ML
INJECTION, SOLUTION INTRAMUSCULAR; INTRAVENOUS AS NEEDED
Status: DISCONTINUED | OUTPATIENT
Start: 2018-01-24 | End: 2018-01-24 | Stop reason: HOSPADM

## 2018-01-24 RX ORDER — OXYCODONE AND ACETAMINOPHEN 5; 325 MG/1; MG/1
1 TABLET ORAL AS NEEDED
Status: DISCONTINUED | OUTPATIENT
Start: 2018-01-24 | End: 2018-01-24 | Stop reason: HOSPADM

## 2018-01-24 RX ORDER — SODIUM CHLORIDE 0.9 % (FLUSH) 0.9 %
5-10 SYRINGE (ML) INJECTION AS NEEDED
Status: DISCONTINUED | OUTPATIENT
Start: 2018-01-24 | End: 2018-01-26 | Stop reason: HOSPADM

## 2018-01-24 RX ORDER — PROTAMINE SULFATE 10 MG/ML
INJECTION, SOLUTION INTRAVENOUS AS NEEDED
Status: DISCONTINUED | OUTPATIENT
Start: 2018-01-24 | End: 2018-01-24 | Stop reason: HOSPADM

## 2018-01-24 RX ORDER — SODIUM CHLORIDE 0.9 % (FLUSH) 0.9 %
5-10 SYRINGE (ML) INJECTION EVERY 8 HOURS
Status: DISCONTINUED | OUTPATIENT
Start: 2018-01-24 | End: 2018-01-24 | Stop reason: HOSPADM

## 2018-01-24 RX ORDER — LIDOCAINE HYDROCHLORIDE 20 MG/ML
INJECTION, SOLUTION EPIDURAL; INFILTRATION; INTRACAUDAL; PERINEURAL AS NEEDED
Status: DISCONTINUED | OUTPATIENT
Start: 2018-01-24 | End: 2018-01-24 | Stop reason: HOSPADM

## 2018-01-24 RX ORDER — NEOSTIGMINE METHYLSULFATE 1 MG/ML
INJECTION INTRAVENOUS AS NEEDED
Status: DISCONTINUED | OUTPATIENT
Start: 2018-01-24 | End: 2018-01-24 | Stop reason: HOSPADM

## 2018-01-24 RX ORDER — FENTANYL CITRATE 50 UG/ML
50 INJECTION, SOLUTION INTRAMUSCULAR; INTRAVENOUS AS NEEDED
Status: DISCONTINUED | OUTPATIENT
Start: 2018-01-24 | End: 2018-01-24 | Stop reason: HOSPADM

## 2018-01-24 RX ADMIN — METOPROLOL TARTRATE 2 MG: 5 INJECTION INTRAVENOUS at 13:15

## 2018-01-24 RX ADMIN — LIDOCAINE HYDROCHLORIDE 100 MG: 20 INJECTION, SOLUTION EPIDURAL; INFILTRATION; INTRACAUDAL; PERINEURAL at 12:56

## 2018-01-24 RX ADMIN — Medication: at 19:13

## 2018-01-24 RX ADMIN — DEXAMETHASONE SODIUM PHOSPHATE 4 MG: 4 INJECTION, SOLUTION INTRA-ARTICULAR; INTRALESIONAL; INTRAMUSCULAR; INTRAVENOUS; SOFT TISSUE at 14:12

## 2018-01-24 RX ADMIN — HEPARIN SODIUM 5000 UNITS: 5000 INJECTION, SOLUTION INTRAVENOUS; SUBCUTANEOUS at 13:28

## 2018-01-24 RX ADMIN — SENNOSIDES 17.2 MG: 8.6 TABLET, FILM COATED ORAL at 21:16

## 2018-01-24 RX ADMIN — MIDAZOLAM HYDROCHLORIDE 4 MG: 1 INJECTION, SOLUTION INTRAMUSCULAR; INTRAVENOUS at 12:52

## 2018-01-24 RX ADMIN — NEOSTIGMINE METHYLSULFATE 3 MG: 1 INJECTION INTRAVENOUS at 14:33

## 2018-01-24 RX ADMIN — SODIUM CHLORIDE, POTASSIUM CHLORIDE, SODIUM LACTATE AND CALCIUM CHLORIDE: 600; 310; 30; 20 INJECTION, SOLUTION INTRAVENOUS at 14:30

## 2018-01-24 RX ADMIN — SODIUM CHLORIDE, POTASSIUM CHLORIDE, SODIUM LACTATE AND CALCIUM CHLORIDE: 600; 310; 30; 20 INJECTION, SOLUTION INTRAVENOUS at 11:35

## 2018-01-24 RX ADMIN — PROTAMINE SULFATE 15 MG: 10 INJECTION, SOLUTION INTRAVENOUS at 14:03

## 2018-01-24 RX ADMIN — METOROPROLOL TARTRATE 2.5 MG: 5 INJECTION, SOLUTION INTRAVENOUS at 18:57

## 2018-01-24 RX ADMIN — FENTANYL CITRATE 100 MCG: 50 INJECTION, SOLUTION INTRAMUSCULAR; INTRAVENOUS at 12:56

## 2018-01-24 RX ADMIN — SODIUM CHLORIDE AND POTASSIUM CHLORIDE: 4.5; 1.49 INJECTION, SOLUTION INTRAVENOUS at 19:04

## 2018-01-24 RX ADMIN — ONDANSETRON 4 MG: 2 INJECTION INTRAMUSCULAR; INTRAVENOUS at 14:12

## 2018-01-24 RX ADMIN — PROPOFOL 200 MG: 10 INJECTION, EMULSION INTRAVENOUS at 12:56

## 2018-01-24 RX ADMIN — ROCURONIUM BROMIDE 40 MG: 10 INJECTION, SOLUTION INTRAVENOUS at 12:56

## 2018-01-24 RX ADMIN — METOPROLOL TARTRATE 3 MG: 5 INJECTION INTRAVENOUS at 13:11

## 2018-01-24 RX ADMIN — Medication 2 G: at 13:22

## 2018-01-24 RX ADMIN — HYDROMORPHONE HYDROCHLORIDE 1 MG: 2 INJECTION, SOLUTION INTRAMUSCULAR; INTRAVENOUS; SUBCUTANEOUS at 13:25

## 2018-01-24 RX ADMIN — HYDROMORPHONE HYDROCHLORIDE 1 MG: 2 INJECTION, SOLUTION INTRAMUSCULAR; INTRAVENOUS; SUBCUTANEOUS at 14:30

## 2018-01-24 RX ADMIN — GLYCOPYRROLATE 0.4 MG: 0.2 INJECTION INTRAMUSCULAR; INTRAVENOUS at 14:30

## 2018-01-24 NOTE — PROGRESS NOTES
PCU SHIFT NURSING NOTE      Bedside shift change report given to Jim Morales RN (oncoming nurse) by Riky Moseley RN (offgoing nurse). Report included the following information SBAR, Kardex, Intake/Output, Recent Results and Cardiac Rhythm NSR. Shift Summary:   1930: Assessment complete. Patient alert and oriented x4. Resting comfortably in recliner chair. Family at bedside. Patient still complains of numbness at the incision site. Per pt and day shift nurse Dr. Jefferson Aguayo is aware and states this is normal. Left leg slightly swollen, red, bruised and warm to the touch. Pedal pulses dopplerable. 2100: Morphine PCA restarted   2220: Dr Sp Neal at bedside   0715: Surgery consent signed. Patient remains NPO. Report given to [facility] [place]. Call bell in reach       Admission Date 1/22/2018   Admission Diagnosis BILATERAL ASO WITH CLAUDICATION   PVD (peripheral vascular disease) ([facility] [address]   Consults IP CONSULT TO HOSPITALIST        Consults   []PT   []OT   []Speech   []Case Management      [] Palliative      Cardiac Monitoring Order   []Yes   []No     IV drips   []Yes    Drip:                            Dose:  Drip:                            Dose:  Drip:                            Dose:   []No     GI Prophylaxis   []Yes   []No         DVT Prophylaxis   SCDs:  Sequential Compression Device: Bilateral          Sarbjit stockings:  Graduated Compression Stockings: Bilateral      [] Medication   []Contraindicated   []None      Activity Level Activity Level: Bed Rest         Purposeful Rounding every 1-2 hour? []Yes   Tapia Score  Total Score: 2   Bed Alarm (If score 3 or >)   []Yes   [] Refused (See signed refusal form in chart)   Tayo Score  Tayo Score: 21   Tayo Score (if score 14 or less)   []PMT consult   []Wound Care consult      []Specialty bed   [] Nutrition consult          Needs prior to discharge:   Home O2 required:    []Yes   []No    If yes, how much O2 required?     Other:    Last Bowel Movement: Last Bowel Movement Date: 01/22/18      Influenza Vaccine Received Flu Vaccine for Current Season (usually Sept-March): No    Patient/Guardian Refused (Notify MD): Yes   Pneumonia Vaccine           Diet Active Orders   Diet    DIET CARDIAC      LDAs               Peripheral IV 01/22/18 Left Hand (Active)   Site Assessment Clean, dry, & intact 1/23/2018  3:24 AM   Phlebitis Assessment 0 1/23/2018  3:24 AM   Infiltration Assessment 0 1/23/2018  3:24 AM   Dressing Status Clean, dry, & intact 1/23/2018  3:24 AM   Dressing Type Disk with Chlorhexadine gluconate (CHG); Tape;Transparent 1/23/2018  3:24 AM   Hub Color/Line Status Green; Infusing 1/23/2018  3:24 AM   Action Taken Open ports on tubing capped 1/23/2018  3:24 AM   Alcohol Cap Used Yes 1/23/2018  3:24 AM                      Urinary Catheter [REMOVED] Urinary Catheter 01/22/18 2- way; Saini-Criteria for Appropriate Use: Surgical procedure    Intake & Output   Date 01/22/18 1900 - 01/23/18 0659 01/23/18 0700 - 01/24/18 0659   Shift 3509-7345 24 Hour Total 2285-1784 8381-1225 24 Hour Total   I  N  T  A  K  E   P. O. 500 500         P. O. 500 500       I.V.  (mL/kg/hr) 587. 5 1887.5         I.V. 200 200         Volume (lactated Ringers infusion)  800         Volume (lactated Ringers infusion)  500         Volume (dextrose 5% lactated ringers infusion) 387.5 387.5       Shift Total  (mL/kg) 1087.5  (13.4) 2387.5  (29.4)      O  U  T  P  U  T   Urine  (mL/kg/hr) 1400 1450 1050  (1.1)  1050      Urine Occurrence(s)   2 x  2 x      Urine Output  50         Urine Output (mL) ([REMOVED] Urinary Catheter 01/22/18 2- way; Saini) 1400 1400 1050  1050    Blood  200         Estimated Blood Loss  200       Shift Total  (mL/kg) 1400  (17.2) 1650  (20.3) 1050  (12.9)  1050  (12.9)   NET -312.5 737.5 -1050  -1050   Weight (kg) 81.3 81.3 81.3 81.3 81.3         Readmission Risk Assessment Tool Score Low Risk            5       Total Score        3 Has Seen PCP in Last 6 Months (Yes=3, No=0)    2 .  Living with Significant Other. Assisted Living. LTAC. SNF. or   Rehab        Criteria that do not apply:    Patient Length of Stay (>5 days = 3)    IP Visits Last 12 Months (1-3=4, 4=9, >4=11)    Pt.  Coverage (Medicare=5 , Medicaid, or Self-Pay=4)    Charlson Comorbidity Score (Age + Comorbid Conditions)       Expected Length of Stay 4d 4h   Actual Length of Stay 1

## 2018-01-24 NOTE — OP NOTES
[facility] OP NOTE    Kate Quach  MR#: [id number]  : 1970  ACCOUNT #: [de-identified]   DATE OF SERVICE: 2018    PREOPERATIVE DIAGNOSIS:  Peripheral vascular disease. POSTOPERATIVE DIAGNOSIS:  Peripheral vascular disease. PROCEDURE PERFORMED:  Right femoral to above knee popliteal bypass graft utilizing 6 mm thin-walled PTFE. SURGEON:  Ceola Le, MD ASSISTANTOza Phalen    ANESTHESIA:  General.    ESTIMATED BLOOD LOSS:  25 mL. IMPLANTS:  PTFE graft. COMPLICATIONS:  None. SPECIMENS REMOVED:  None. DESCRIPTION OF PROCEDURE:  With the patient supine on the operating table, general anesthesia was induced, and the right leg prepared as a sterile field. A skin incision made over the thigh distally and medially. The proximal popliteal artery was identified and isolated. It was small, but patent. An oblique incision was then made in the groin, where the common femoral artery and its branches were identified and isolated. The patient was systemically heparinized. The popliteal artery was occluded and the longitudinal arteriotomy made. A 6 mm thin-walled PTFE graft was selected and anastomosed end-to-side to the popliteal with a running simple suture of 5-0 PTFE. The graft was brought through a subcutaneous tunnel to lie in the groin. Care was taken not to rotate the graft in its transit. He has a common femoral artery that was occluded, and a longitudinal arteriotomy made. The PTFE graft was appropriately fashioned and anastomosed end-to-side to the common femoral with a running simple suture of 5-0 PTFE. Release of the vascular occlusion clamps resulted in an excellent pulse and Doppler signal in the system. Heparinization was partially reversed with protamine sulfate. The wounds were examined for hemostasis, which was considered adequate.   Subcutaneous tissues were closed with 2-0 Vicryl, and the skin with a subcuticular suture of 3-0 Vicryl. Flexible collodion was applied. Attention was then directed to the left thigh, which was examined. She had had evidence of bleeding in the left thigh. I did not find any evidence of drainable hematoma, only swelling and ecchymoses in the thigh. It was felt that this did not need to be opened and explored further. The patient will return to the recovery area in satisfactory condition.       MD BRAN Ragland / ASTRID  D: 01/24/2018 14:57     T: 01/24/2018 15:30  JOB #: [id number]  CC: Thom Mane MD  CC: Rajeev Guzmán MD

## 2018-01-24 NOTE — PROGRESS NOTES
Hospitalist Progress Note    NAME: Pastor Marie   :  1970   MRN:  [id number]       Assessment / Plan:  HTN  cont nitropaste  PRN   Cont Metoprolol 2.5mg IV Q6H with hold parameters while npo     Smoking  Counseled about quiting and she claims to already decided to quit      PAD  S/p Left Femoral Popliteal Bipass and  plan for Right Femoral Popliteal Bipass   Management as per Vascular surgery     Code Status: Full  DVT Prophylaxis: Per Vascular surgery      Body mass index is 30.77 kg/(m^2). Subjective:     Chief Complaint / Reason for Physician Visit  \"no c/o\". Discussed with RN events overnight. Review of Systems:  Symptom Y/N Comments  Symptom Y/N Comments   Fever/Chills n   Chest Pain n    Poor Appetite    Edema n    Cough n   Abdominal Pain n    Sputum n   Joint Pain n    SOB/SANTOS n   Pruritis/Rash     Nausea/vomit n   Tolerating PT/OT     Diarrhea n   Tolerating Diet     Constipation    Other       Could NOT obtain due to:      Objective:     VITALS:   Last 24hrs VS reviewed since prior progress note. Most recent are:  Patient Vitals for the past 24 hrs:   Temp Pulse Resp BP SpO2   18 1447 98.7 °F (37.1 °C) 90 17 132/88 100 %   18 1205 98.1 °F (36.7 °C) 100 18 123/73 96 %   18 0716 98.2 °F (36.8 °C) 94 16 102/85 97 %   18 0624 - 95 - 105/67 -   18 0315 98.1 °F (36.7 °C) 88 16 106/74 97 %   18 2320 97.8 °F (36.6 °C) 87 16 113/85 99 %   18 1902 97.9 °F (36.6 °C) - 16 112/71 97 %   18 1500 97.7 °F (36.5 °C) 86 16 112/72 100 %       Intake/Output Summary (Last 24 hours) at 18 1455  Last data filed at 18 1430   Gross per 24 hour   Intake             1000 ml   Output              775 ml   Net              225 ml        PHYSICAL EXAM:  General: WD, WN. Alert, cooperative, no acute distress    EENT:  EOMI. Anicteric sclerae. MMM  Resp:  CTA bilaterally, no wheezing or rales.   No accessory muscle use  CV:  Regular  rhythm,  No edema  GI:  Soft, Non distended, Non tender.  +Bowel sounds  Neurologic:  Alert and oriented X 3, normal speech, grossly intact  Psych:   Good insight. Not anxious nor agitated  Skin:  No rashes. No jaundice    Reviewed most current lab test results and cultures  YES  Reviewed most current radiology test results   YES  Review and summation of old records today    NO  Reviewed patient's current orders and MAR    YES  PMH/SH reviewed - no change compared to H&P  ________________________________________________________________________  Care Plan discussed with:    Comments   Patient x    Family      RN x    Care Manager     Consultant                        Multidiciplinary team rounds were held today with , nursing, pharmacist and clinical coordinator. Patient's plan of care was discussed; medications were reviewed and discharge planning was addressed. ________________________________________________________________________  Total NON critical care TIME:  30   Minutes    Total CRITICAL CARE TIME Spent:   Minutes non procedure based      Comments   >50% of visit spent in counseling and coordination of care     ________________________________________________________________________  Rachelle Brooks MD     Procedures: see electronic medical records for all procedures/Xrays and details which were not copied into this note but were reviewed prior to creation of Plan. LABS:  I reviewed today's most current labs and imaging studies.   Pertinent labs include:  Recent Labs      01/24/18 0318 01/23/18   0255   WBC  7.9  11.7*   HGB  11.8  12.7   HCT  35.8  36.4   PLT  249  279     Recent Labs      01/24/18 0318 01/23/18   0255   NA  137  135*   K  3.5  3.2*   CL  103  101   CO2  27  28   GLU  82  127*   BUN  6  10   CREA  0.56  0.71   CA  8.4*  8.5   ALB   --   2.5*   TBILI   --   0.4   SGOT   --   17   ALT   --   16       Signed: Rachelle Brooks MD

## 2018-01-24 NOTE — PERIOP NOTES
TRANSFER - OUT REPORT:    Verbal report given to KEVIN Rmoan on Jenn Huggins  being transferred to PCU, [phone] for routine progression of care       Report consisted of patients Situation, Background, Assessment and   Recommendations(SBAR). Information from the following report(s) SBAR, Kardex, OR Summary, Intake/Output, MAR and Cardiac Rhythm NSR was reviewed with the receiving nurse. Opportunity for questions and clarification was provided. Patient transported with:   Monitor  Registered Nurse  Tech     Updated patient family at [phone].

## 2018-01-24 NOTE — PERIOP NOTES
TRANSFER - IN REPORT:    Verbal report received from Jsoé Molina RN(name) on Cary Lambert  being received from PCU Room 2261(unit) for ordered procedure      Report consisted of patients Situation, Background, Assessment and   Recommendations(SBAR). Information from the following report(s) SBAR, Kardex, Intake/Output, MAR and Recent Results was reviewed with the receiving nurse. Opportunity for questions and clarification was provided. Assessment completed upon patients arrival to unit and care assumed.

## 2018-01-24 NOTE — PERIOP NOTES
Handoff Report from Operating Room to PACU    Report received from LAURA Gallegos RN and RADHA Gasca CRNA regarding Brandie Voss. Surgeon(s):  ES Zhao MD  And Procedure(s) (LRB):  RIGHT FEMORAL-POPLITEAL BYPASS PTFE  (Right)  confirmed   with allergies, drains and dressings discussed. Anesthesia type, drugs, patient history, complications, estimated blood loss, vital signs, intake and output, and last pain medication, lines, reversal medications and temperature were reviewed.

## 2018-01-24 NOTE — BRIEF OP NOTE
BRIEF OPERATIVE NOTE    Date of Procedure: 1/24/2018   Preoperative Diagnosis: BILATERAL ASO WITH CLAUDICATION   Postoperative Diagnosis: BILATERAL ASO WITH CLAUDICATION     Procedure(s):  RIGHT FEMORAL-POPLITEAL BYPASS PTFE   Surgeon(s) and Role:     * ES Rizzo MD - Primary         Assistant Staff: None      Surgical Staff:  Circ-1: Matheus Almazan RN  Circ-Intern: Lily Tbaor RN  Scrub Tech-1: Karina Mcmullen  Surg Asst-1: Cortez Doyle  Event Time In   Incision Start 1317   Incision Close 1430     Anesthesia: General   Estimated Blood Loss: 20cc  Specimens: * No specimens in log *   Findings: aso   Complications: 0  Implants:   Implant Name Type Inv.  Item Serial No.  Lot No. LRB No. Used Action   GRAFT VASC TW [id number] -- [employer] - [id number]   GRAFT VASC TW [id number] -- Magdi Mcginnis [id number]  [employer] NA Right 1 Implanted

## 2018-01-24 NOTE — CONSULTS
Hospitalist Consultation Note    NAME:  Federico Chi   :   1970   MRN:   [id number]     ATTENDING: Penny Serra MD  PCP:  Lashanda Gamez MD    Date/Time:  2018 10:40 PM      Recommendations/Plan: We were not aware about the consult, we just noted consults popping up in out pending folder so I came to see the patient    HTN  I will switch her nitropaste to PRN   Considering she will be NPO, I will hold her PO meds and switch her to Metoprolol 2.5mg IV Q6H with hold parameters    Smoking  I have counseled her in details about quiting and she claims to already decided to quit     PAD  S/p Left Femoral Popliteal Bipass and now plan for Right Femoral Popliteal Bipass tomorrow  Management as per Vascular surgery    Code Status: Full  DVT Prophylaxis: Per Vascular surgery    Dr Trudy Frankel will resume care on consult tomorrow 7am      Subjective:   REQUESTING PHYSICIAN: Ajay Reasons  REASON FOR CONSULT:      Roxy Morley is a 56 y.o.  female and seems consult was placed for HTN. As per patient, she is admitted for vascular surgeries of her legs. Pt claims to have pain in the legs due to Left surgery related and R due to PAD. Pt denies any fever, chills, nausea, vomiting, diarrhea, chest pain, cough, shortness of breath, problems urination.      Past Medical History:   Diagnosis Date    Adverse effect of anesthesia     Hypertension     Ill-defined condition     PAD      Past Surgical History:   Procedure Laterality Date    BREAST SURGERY PROCEDURE UNLISTED      left breast biopsy in the past  about 4  years ago marker  left in place      Social History   Substance Use Topics    Smoking status: Current Every Day Smoker     Packs/day: 0.50     Years: 22.00    Smokeless tobacco: Current User    Alcohol use 4.2 oz/week     7 Glasses of wine per week      Comment:   durning the week or weekend  7 total       Family History   Problem Relation Age of Onset    Stroke Father Allergies   Allergen Reactions    Tape [Adhesive] Other (comments)     Possibly tape burn  To groin on the right       Prior to Admission medications    Medication Sig Start Date End Date Taking? Authorizing Provider   potassium chloride SR (KLOR-CON 10) 10 mEq tablet Take 20 mEq by mouth two (2) times a day. 1/17/18  Yes Darryn Soni MD   CILOSTAZOL (PLETAL PO) Take  by mouth. Yes Historical Provider   atenolol-chlorthalidone (TENORETIC) 100-25 mg per tablet Take 1 Tab by mouth daily. Historical Provider   clopidogrel (PLAVIX) 75 mg tab Take 75 mg by mouth daily. Historical Provider       REVIEW OF SYSTEMS:     Total of 12 systems reviewed as follows:   I am not able to complete the review of systems because:    The patient is intubated and sedated    The patient has altered mental status due to his acute medical problems    The patient has baseline aphasia from prior stroke(s)    The patient has baseline dementia and is not reliable historian                 POSITIVE= underlined text  Negative = text not underlined  General:  fever, chills, sweats, generalized weakness, weight loss/gain,      loss of appetite   Eyes:    blurred vision, eye pain, loss of vision, double vision  ENT:    rhinorrhea, pharyngitis   Respiratory:   cough, sputum production, SOB, wheezing, SANTOS, pleuritic pain   Cardiology:   chest pain, palpitations, orthopnea, PND, edema, syncope   Gastrointestinal:  abdominal pain , N/V, dysphagia, diarrhea, constipation, bleeding   Genitourinary:  frequency, urgency, dysuria, hematuria, incontinence   Muskuloskeletal :  Leg pain, arthralgia, myalgia   Hematology:  easy bruising, nose or gum bleeding, lymphadenopathy   Dermatological: rash, ulceration, pruritis   Endocrine:   hot flashes or polydipsia   Neurological:  headache, dizziness, confusion, focal weakness, paresthesia,     Speech difficulties, memory loss, gait disturbance  Psychological: Feelings of anxiety, depression, agitation    Objective:   VITALS:    Visit Vitals    /71 (BP 1 Location: Left arm, BP Patient Position: Sitting)    Pulse 86    Temp 97.9 °F (36.6 °C)    Resp 16    Ht 5' 4\" (1.626 m)    Wt 81.3 kg (179 lb 3.7 oz)    LMP 2018    SpO2 97%    BMI 30.77 kg/m2     Temp (24hrs), Av.1 °F (36.7 °C), Min:97.7 °F (36.5 °C), Max:98.3 °F (36.8 °C)      PHYSICAL EXAM:   General:    Alert, cooperative, no distress, appears stated age. HEENT: Atraumatic, anicteric sclerae, pink conjunctivae     No oral ulcers, mucosa moist, throat clear  Neck:  Supple, symmetrical,  thyroid: non tender  Lungs:   Clear to auscultation bilaterally. No Wheezing or Rhonchi. No rales. Chest wall:  No tenderness  No Accessory muscle use. Heart:   Regular  rhythm,  No  murmur   No edema  Abdomen:   Soft, non-tender. Not distended. Bowel sounds normal  Extremities: No cyanosis. No clubbing  Skin:     Incision from surgery on left leg, Not pale. Not Jaundiced  No rashes   Psych:  Good insight. Not depressed. Not anxious or agitated. Neurologic: EOMs intact. No facial asymmetry. No aphasia or slurred speech. Symmetrical strength, Alert and oriented X 4.     _______________________________________________________________________  Care Plan discussed with:    Comments   Patient y    Family      RN y    Care Manager                    Consultant:      ____________________________________________________________________  TOTAL TIME:  55 mins    Comments    y Reviewed previous records   >50% of visit spent in counseling and coordination of care y Discussion with patient and questions answered       Critical Care Provided     Minutes non procedure based  ________________________________________________________________________  Signed: Sedrick Buck MD      Procedures: see electronic medical records for all procedures/Xrays and details which were not copied into this note but were reviewed prior to creation of Plan.     LAB DATA REVIEWED:    Recent Results (from the past 24 hour(s))   CBC WITH AUTOMATED DIFF    Collection Time: 01/23/18  2:55 AM   Result Value Ref Range    WBC 11.7 (H) 3.6 - 11.0 K/uL    RBC 3.70 (L) 3.80 - 5.20 M/uL    HGB 12.7 11.5 - 16.0 g/dL    HCT 36.4 35.0 - 47.0 %    MCV 98.4 80.0 - 99.0 FL    MCH 34.3 (H) 26.0 - 34.0 PG    MCHC 34.9 30.0 - 36.5 g/dL    RDW 12.5 11.5 - 14.5 %    PLATELET 058 818 - 642 K/uL    NEUTROPHILS 78 (H) 32 - 75 %    LYMPHOCYTES 11 (L) 12 - 49 %    MONOCYTES 11 5 - 13 %    EOSINOPHILS 0 0 - 7 %    BASOPHILS 0 0 - 1 %    ABS. NEUTROPHILS 9.2 (H) 1.8 - 8.0 K/UL    ABS. LYMPHOCYTES 1.3 0.8 - 3.5 K/UL    ABS. MONOCYTES 1.2 (H) 0.0 - 1.0 K/UL    ABS. EOSINOPHILS 0.0 0.0 - 0.4 K/UL    ABS. BASOPHILS 0.0 0.0 - 0.1 K/UL   METABOLIC PANEL, COMPREHENSIVE    Collection Time: 01/23/18  2:55 AM   Result Value Ref Range    Sodium 135 (L) 136 - 145 mmol/L    Potassium 3.2 (L) 3.5 - 5.1 mmol/L    Chloride 101 97 - 108 mmol/L    CO2 28 21 - 32 mmol/L    Anion gap 6 5 - 15 mmol/L    Glucose 127 (H) 65 - 100 mg/dL    BUN 10 6 - 20 MG/DL    Creatinine 0.71 0.55 - 1.02 MG/DL    BUN/Creatinine ratio 14 12 - 20      GFR est AA >60 >60 ml/min/1.73m2    GFR est non-AA >60 >60 ml/min/1.73m2    Calcium 8.5 8.5 - 10.1 MG/DL    Bilirubin, total 0.4 0.2 - 1.0 MG/DL    ALT (SGPT) 16 12 - 78 U/L    AST (SGOT) 17 15 - 37 U/L    Alk.  phosphatase 62 45 - 117 U/L    Protein, total 5.9 (L) 6.4 - 8.2 g/dL    Albumin 2.5 (L) 3.5 - 5.0 g/dL    Globulin 3.4 2.0 - 4.0 g/dL    A-G Ratio 0.7 (L) 1.1 - 2.2     EKG, 12 LEAD, INITIAL    Collection Time: 01/23/18  9:59 AM   Result Value Ref Range    Ventricular Rate 90 BPM    Atrial Rate 90 BPM    P-R Interval 134 ms    QRS Duration 94 ms    Q-T Interval 388 ms    QTC Calculation (Bezet) 474 ms    Calculated P Axis 66 degrees    Calculated R Axis -55 degrees    Calculated T Axis 41 degrees    Diagnosis       Normal sinus rhythm  Incomplete right bundle branch block  Left anterior fascicular block  Confirmed by Bennie Angelucci ([id number]) on 1/23/2018 10:01:05 PM         _____________________________  Hospitalist: Federico Holder MD

## 2018-01-24 NOTE — ANESTHESIA PREPROCEDURE EVALUATION
Anesthetic History               Review of Systems / Medical History  Patient summary reviewed, nursing notes reviewed and pertinent labs reviewed    Pulmonary          Smoker         Neuro/Psych   Within defined limits           Cardiovascular    Hypertension          PAD    Exercise tolerance: >4 METS     GI/Hepatic/Renal  Within defined limits              Endo/Other        Obesity     Other Findings            Physical Exam    Airway  Mallampati: II  TM Distance: > 6 cm  Neck ROM: normal range of motion   Mouth opening: Normal     Cardiovascular  Regular rate and rhythm,  S1 and S2 normal,  no murmur, click, rub, or gallop  Rhythm: regular  Rate: normal         Dental    Dentition: Caps/crowns     Pulmonary  Breath sounds clear to auscultation               Abdominal  GI exam deferred       Other Findings            Anesthetic Plan    ASA: 3  Anesthesia type: general          Induction: Intravenous  Anesthetic plan and risks discussed with: Patient

## 2018-01-24 NOTE — ANESTHESIA POSTPROCEDURE EVALUATION
Post-Anesthesia Evaluation and Assessment    Patient: Herbert Lemus MRN: [id number]  SSN: [id number]    YOB: 1970  Age: 56 y.o. Sex: female       Cardiovascular Function/Vital Signs  Visit Vitals    /71 (BP 1 Location: Right arm, BP Patient Position: At rest)    Pulse 88    Temp 37.1 °C (98.7 °F)    Resp 11    Ht 5' 4\" (1.626 m)    Wt 81.3 kg (179 lb 3.7 oz)    SpO2 94%    BMI 30.77 kg/m2       Patient is status post general anesthesia for Procedure(s):  RIGHT FEMORAL-POPLITEAL BYPASS PTFE . Nausea/Vomiting: None    Postoperative hydration reviewed and adequate. Pain:  Pain Scale 1: Numeric (0 - 10) (01/24/18 1515)  Pain Intensity 1: 2 (01/24/18 1515)   Managed    Neurological Status:   Neuro (WDL): Exceptions to WDL (01/24/18 1450)  Neuro  Neurologic State: Drowsy; Eyes open spontaneously (01/24/18 1450)  Orientation Level: Oriented X4 (01/24/18 1450)  Cognition: Follows commands (01/24/18 1450)  Speech: Clear (01/24/18 1450)  LUE Motor Response: Purposeful (01/24/18 1450)  LLE Motor Response: Purposeful;Weak (01/24/18 1450)  RUE Motor Response: Purposeful (01/24/18 1450)  RLE Motor Response: Purposeful;Weak (01/24/18 1450)   At baseline    Mental Status and Level of Consciousness: Arousable    Pulmonary Status:   O2 Device: Room air (01/24/18 1530)   Adequate oxygenation and airway patent    Complications related to anesthesia: None    Post-anesthesia assessment completed.  No concerns    Signed By: John Ortez MD     January 24, 2018

## 2018-01-24 NOTE — PERIOP NOTES
09:46= informed Lisa Knight on PCU that surgery time has changed to 12:30, therefore, we will pick  Pt up at 11:30.

## 2018-01-25 LAB
ANION GAP SERPL CALC-SCNC: 7 MMOL/L (ref 5–15)
BASOPHILS # BLD: 0 K/UL (ref 0–0.1)
BASOPHILS NFR BLD: 0 % (ref 0–1)
BUN SERPL-MCNC: 7 MG/DL (ref 6–20)
BUN/CREAT SERPL: 14 (ref 12–20)
CALCIUM SERPL-MCNC: 8.6 MG/DL (ref 8.5–10.1)
CHLORIDE SERPL-SCNC: 103 MMOL/L (ref 97–108)
CO2 SERPL-SCNC: 26 MMOL/L (ref 21–32)
CREAT SERPL-MCNC: 0.51 MG/DL (ref 0.55–1.02)
EOSINOPHIL # BLD: 0 K/UL (ref 0–0.4)
EOSINOPHIL NFR BLD: 0 % (ref 0–7)
ERYTHROCYTE [DISTWIDTH] IN BLOOD BY AUTOMATED COUNT: 12.5 % (ref 11.5–14.5)
GLUCOSE SERPL-MCNC: 90 MG/DL (ref 65–100)
HCT VFR BLD AUTO: 35.9 % (ref 35–47)
HGB BLD-MCNC: 11.9 G/DL (ref 11.5–16)
LYMPHOCYTES # BLD: 1.2 K/UL (ref 0.8–3.5)
LYMPHOCYTES NFR BLD: 11 % (ref 12–49)
MCH RBC QN AUTO: 32.7 PG (ref 26–34)
MCHC RBC AUTO-ENTMCNC: 33.1 G/DL (ref 30–36.5)
MCV RBC AUTO: 98.6 FL (ref 80–99)
MONOCYTES # BLD: 1.1 K/UL (ref 0–1)
MONOCYTES NFR BLD: 10 % (ref 5–13)
NEUTS SEG # BLD: 8.6 K/UL (ref 1.8–8)
NEUTS SEG NFR BLD: 79 % (ref 32–75)
PLATELET # BLD AUTO: 279 K/UL (ref 150–400)
POTASSIUM SERPL-SCNC: 4.1 MMOL/L (ref 3.5–5.1)
RBC # BLD AUTO: 3.64 M/UL (ref 3.8–5.2)
SODIUM SERPL-SCNC: 136 MMOL/L (ref 136–145)
WBC # BLD AUTO: 10.9 K/UL (ref 3.6–11)

## 2018-01-25 PROCEDURE — 74011250637 HC RX REV CODE- 250/637: Performed by: INTERNAL MEDICINE

## 2018-01-25 PROCEDURE — 85025 COMPLETE CBC W/AUTO DIFF WBC: CPT | Performed by: NURSE PRACTITIONER

## 2018-01-25 PROCEDURE — 74011000250 HC RX REV CODE- 250: Performed by: INTERNAL MEDICINE

## 2018-01-25 PROCEDURE — 36415 COLL VENOUS BLD VENIPUNCTURE: CPT | Performed by: NURSE PRACTITIONER

## 2018-01-25 PROCEDURE — 65660000000 HC RM CCU STEPDOWN

## 2018-01-25 PROCEDURE — 74011250637 HC RX REV CODE- 250/637: Performed by: SURGERY

## 2018-01-25 PROCEDURE — 80048 BASIC METABOLIC PNL TOTAL CA: CPT | Performed by: NURSE PRACTITIONER

## 2018-01-25 PROCEDURE — 74011250637 HC RX REV CODE- 250/637: Performed by: NURSE PRACTITIONER

## 2018-01-25 PROCEDURE — 74011250636 HC RX REV CODE- 250/636: Performed by: SURGERY

## 2018-01-25 PROCEDURE — 74011000258 HC RX REV CODE- 258: Performed by: NURSE PRACTITIONER

## 2018-01-25 RX ORDER — SODIUM CHLORIDE 450 MG/100ML
25 INJECTION, SOLUTION INTRAVENOUS CONTINUOUS
Status: DISCONTINUED | OUTPATIENT
Start: 2018-01-25 | End: 2018-01-25

## 2018-01-25 RX ORDER — ATENOLOL 50 MG/1
50 TABLET ORAL EVERY 12 HOURS
Status: DISCONTINUED | OUTPATIENT
Start: 2018-01-25 | End: 2018-01-26 | Stop reason: HOSPADM

## 2018-01-25 RX ORDER — SULFAMETHOXAZOLE AND TRIMETHOPRIM 800; 160 MG/1; MG/1
1 TABLET ORAL EVERY 12 HOURS
Status: DISCONTINUED | OUTPATIENT
Start: 2018-01-25 | End: 2018-01-26 | Stop reason: HOSPADM

## 2018-01-25 RX ORDER — SULFAMETHOXAZOLE AND TRIMETHOPRIM 800; 160 MG/1; MG/1
1 TABLET ORAL DAILY
Status: DISCONTINUED | OUTPATIENT
Start: 2018-01-25 | End: 2018-01-25

## 2018-01-25 RX ORDER — ENOXAPARIN SODIUM 100 MG/ML
40 INJECTION SUBCUTANEOUS EVERY 24 HOURS
Status: DISCONTINUED | OUTPATIENT
Start: 2018-01-25 | End: 2018-01-26 | Stop reason: HOSPADM

## 2018-01-25 RX ORDER — CLOPIDOGREL BISULFATE 75 MG/1
75 TABLET ORAL DAILY
Status: DISCONTINUED | OUTPATIENT
Start: 2018-01-25 | End: 2018-01-26 | Stop reason: HOSPADM

## 2018-01-25 RX ORDER — INDOMETHACIN 75 MG/1
75 CAPSULE, EXTENDED RELEASE ORAL 2 TIMES DAILY WITH MEALS
Status: DISCONTINUED | OUTPATIENT
Start: 2018-01-25 | End: 2018-01-26 | Stop reason: HOSPADM

## 2018-01-25 RX ADMIN — SENNOSIDES 17.2 MG: 8.6 TABLET, FILM COATED ORAL at 21:23

## 2018-01-25 RX ADMIN — ATENOLOL 50 MG: 50 TABLET ORAL at 18:22

## 2018-01-25 RX ADMIN — SULFAMETHOXAZOLE AND TRIMETHOPRIM 1 TABLET: 800; 160 TABLET ORAL at 09:15

## 2018-01-25 RX ADMIN — INDOMETHACIN 75 MG: 75 CAPSULE, EXTENDED RELEASE ORAL at 18:22

## 2018-01-25 RX ADMIN — SULFAMETHOXAZOLE AND TRIMETHOPRIM 1 TABLET: 800; 160 TABLET ORAL at 21:23

## 2018-01-25 RX ADMIN — CLOPIDOGREL BISULFATE 75 MG: 75 TABLET ORAL at 09:15

## 2018-01-25 RX ADMIN — ENOXAPARIN SODIUM 40 MG: 30 INJECTION SUBCUTANEOUS at 13:00

## 2018-01-25 RX ADMIN — SODIUM CHLORIDE 25 ML/HR: 450 INJECTION, SOLUTION INTRAVENOUS at 09:22

## 2018-01-25 RX ADMIN — METOROPROLOL TARTRATE 2.5 MG: 5 INJECTION, SOLUTION INTRAVENOUS at 00:42

## 2018-01-25 RX ADMIN — METOROPROLOL TARTRATE 2.5 MG: 5 INJECTION, SOLUTION INTRAVENOUS at 13:01

## 2018-01-25 RX ADMIN — INDOMETHACIN 75 MG: 75 CAPSULE, EXTENDED RELEASE ORAL at 12:35

## 2018-01-25 RX ADMIN — Medication 10 ML: at 05:48

## 2018-01-25 NOTE — PROGRESS NOTES
DEBOR[facility] Initial Assessment        Current admission assessment--  Patient came in for bilateral ASO with claudication and had left fem-popliteal bypass on 1/22/18 and on 1/24/18 she had right fem popliteal bypass done. Patient lives in one story home with her fiphilip. She states she was independent in adl's prior to coming to the hospital. She drives and she works. She denies using dme. She gets her prescriptions filled at the [facility] in [place], [place]. She denies using home health services or snf in the past. She plans to go home when medically stable and she states her fiance will take her home when she is discharged. Care Management Interventions  PCP Verified by CM: Yes  Current Support Network:  Other (Lives with her fiphilip. )  Confirm Follow Up Transport: Friends  Plan discussed with Pt/Family/Caregiver: Yes  Discharge Location  Discharge Placement: Home                  Abiodun [id number] EXT [id number]

## 2018-01-25 NOTE — PROGRESS NOTES
Awake/alert. C/O improving numbness left leg. Pain improved. PE: Feet well perfused. Swelling  Decreased left side but some erythema. Hgb stable. WBC 10k    A/P: Doing well. Mobilize. Erythema likely due to graft reaction and inflammation from blood. Will resume Plavix. Add Bactrim DS and Indocin for leg erythema.

## 2018-01-25 NOTE — PROGRESS NOTES
Vascular Surgery Progress Note  Alex Bettencourt ACNP-BC  1/25/2018       Subjective:     Ms. Aniya Gallagher is a 55 yo WF with a pmhx significant for HTN. She was admitted to the hospital s/p left fem to above the knee popliteal bypass graft w PTFE on 01/22/18. She is now s/p right fem to above the knee popliteal bypass graft w PTFE on 01/24/18. She continues to have significant edema and ecchymosis to left groin and thigh. Pain is controlled. H&H is stable. Nursing Data:     Patient Vitals for the past 24 hrs:   BP Temp Pulse Resp SpO2   01/25/18 0748 116/81 98.3 °F (36.8 °C) 96 16 99 %   01/25/18 0547 102/65 - 100 - -   01/25/18 0358 116/79 98.1 °F (36.7 °C) 95 16 100 %   01/25/18 0042 127/78 - (!) 102 - -   01/24/18 2320 122/85 98.2 °F (36.8 °C) 91 18 99 %   01/24/18 1907 121/82 97.8 °F (36.6 °C) 87 18 99 %   01/24/18 1619 109/64 - 87 17 -   01/24/18 1545 107/81 97.6 °F (36.4 °C) 82 12 93 %   01/24/18 1530 103/71 - 88 11 94 %   01/24/18 1515 108/75 - 84 17 97 %   01/24/18 1500 119/78 - 93 13 97 %   01/24/18 1455 (!) 121/92 - 95 16 97 %   01/24/18 1450 130/87 - 93 17 100 %   01/24/18 1447 132/88 98.7 °F (37.1 °C) 90 17 100 %   01/24/18 1445 126/83 - (!) 102 20 99 %   01/24/18 1444 132/88 - - - -   01/24/18 1205 123/73 98.1 °F (36.7 °C) 100 18 96 %     ---------------------------------------------------------------------------------------------------------    Intake/Output Summary (Last 24 hours) at 01/25/18 1029  Last data filed at 01/25/18 8471   Gross per 24 hour   Intake             1000 ml   Output             4125 ml   Net            -3125 ml       Exam:     Physical Exam   Constitutional: She is oriented to person, place, and time. She appears well-developed and well-nourished. HENT:   Head: Normocephalic and atraumatic. Eyes: Pupils are equal, round, and reactive to light. Neck: Normal range of motion. Cardiovascular: Tachycardia present.     Pulses:       Femoral pulses are 2+ on the right side, and 2+ on the left side. Left foot warm to the touch. Pulmonary/Chest: Effort normal and breath sounds normal.   Abdominal: Soft. Bowel sounds are normal.   Musculoskeletal: Normal range of motion. Neurological: She is alert and oriented to person, place, and time. Skin:   Incision to the left groin with edema and echymosis. Left thigh edema. Incision to the left inferior thigh intact  Incision to right thigh and groin C/D/I. Scatter ecchymosis to right thigh. Psychiatric: Judgment and thought content normal.       Lab Review:     . Recent Results (from the past 24 hour(s))   CBC WITH AUTOMATED DIFF    Collection Time: 01/25/18  4:02 AM   Result Value Ref Range    WBC 10.9 3.6 - 11.0 K/uL    RBC 3.64 (L) 3.80 - 5.20 M/uL    HGB 11.9 11.5 - 16.0 g/dL    HCT 35.9 35.0 - 47.0 %    MCV 98.6 80.0 - 99.0 FL    MCH 32.7 26.0 - 34.0 PG    MCHC 33.1 30.0 - 36.5 g/dL    RDW 12.5 11.5 - 14.5 %    PLATELET 863 737 - 739 K/uL    NEUTROPHILS 79 (H) 32 - 75 %    LYMPHOCYTES 11 (L) 12 - 49 %    MONOCYTES 10 5 - 13 %    EOSINOPHILS 0 0 - 7 %    BASOPHILS 0 0 - 1 %    ABS. NEUTROPHILS 8.6 (H) 1.8 - 8.0 K/UL    ABS. LYMPHOCYTES 1.2 0.8 - 3.5 K/UL    ABS. MONOCYTES 1.1 (H) 0.0 - 1.0 K/UL    ABS. EOSINOPHILS 0.0 0.0 - 0.4 K/UL    ABS.  BASOPHILS 0.0 0.0 - 0.1 K/UL   METABOLIC PANEL, BASIC    Collection Time: 01/25/18  4:02 AM   Result Value Ref Range    Sodium 136 136 - 145 mmol/L    Potassium 4.1 3.5 - 5.1 mmol/L    Chloride 103 97 - 108 mmol/L    CO2 26 21 - 32 mmol/L    Anion gap 7 5 - 15 mmol/L    Glucose 90 65 - 100 mg/dL    BUN 7 6 - 20 MG/DL    Creatinine 0.51 (L) 0.55 - 1.02 MG/DL    BUN/Creatinine ratio 14 12 - 20      GFR est AA >60 >60 ml/min/1.73m2    GFR est non-AA >60 >60 ml/min/1.73m2    Calcium 8.6 8.5 - 10.1 MG/DL       XR Results (most recent):    Results from Hospital Encounter encounter on 01/16/18   XR CHEST PA LAT   Narrative EXAM:  XR CHEST PA LAT    INDICATION:   pre-op for left femoral popliteal bypass. Hypertension. COMPARISON: None. FINDINGS: PA and lateral radiographs of the chest demonstrate clear lungs. The  cardiac and mediastinal contours and pulmonary vascularity are normal.  The  bones and soft tissues are within normal limits. Impression IMPRESSION: Normal chest.                     Assessment/Plan:      Principal Problem:  PVD (peripheral vascular disease) s/p   Left fem to above the knee popliteal bypass graft w PTFE on 01/22/18  Right fem to above the knee popliteal bypass graft with PTFE on 01/24/18. Acute Blood Loss Anemia     Active Problems. Sinus Tachycardia confirmed by EKG   Hypertension   Hypokalemia resolved     H&Hstable. Resume Plavix and prophylactic LMWH. Patient continue to be mildly tachycardic though it has improved. Decrease IVF. BP control improved. Continue home medication atenolol. Continue PCA and PRN Percocet. Continue BM regimen. Encourage OOB.       VTE prophylaxis:  Prophylactic LMWH    Disposition:   Home likely in the am.

## 2018-01-25 NOTE — PROGRESS NOTES
Spiritual Care Assessment/Progress Notes    Greta Bailey [id number]  [id number]    1970  56 y.o.  female    Patient Telephone Number: There is no home phone number on file. Hinduism Affiliation: No preference   Language: English   Extended Emergency Contact Information  Primary Emergency Contact: [address] [facility]  Mobile Phone: [phone]  Relation: Brother   Patient Active Problem List    Diagnosis Date Noted    PVD (peripheral vascular disease) ([facility] [address] 01/22/2018        Date: 1/25/2018       Level of Hinduism/Spiritual Activity:  []         Involved in steve tradition/spiritual practice    [x]         Not involved in steve tradition/spiritual practice  []         Spiritually oriented    []         Claims no spiritual orientation    []         seeking spiritual identity  []         Feels alienated from Presybeterian practice/tradition  []         Feels angry about Presybeterian practice/tradition  [x]         Spirituality/Presybeterian tradition does not seem to be a resource for coping at this time.   []         Not able to assess due to medical condition    Services Provided Today:  []         crisis intervention    []         reading Scriptures  [x]         spiritual assessment    []         prayer  [x]         empathic listening/emotional support  []         rites and rituals (cite in comments)  []         life review     []         Presybeterian support  []         theological development   []         advocacy  []         ethical dialog     []         blessing  []         bereavement support    []         support to family  []         anticipatory grief support   []         help with AMD  []         spiritual guidance    []         meditation      Spiritual Care Needs  []         Emotional Support  []         Spiritual/Hinduism Care  []         Loss/Adjustment  []         Advocacy/Referral                /Ethics  [x]         No needs expressed at               this time  []         Other: (note in               comments)  Spiritual Care Plan  []         Follow up visits with               pt/family  []         Provide materials  []         Schedule sacraments  []         Contact Community               Clergy  [x]         Follow up as needed  []         Other: (note in               comments)     Comments:  Initial visit on PCU for spiritual assessment. No visitors present, but patient shared she expects some family to visit this afternoon. She spoke briefly about her current medical issues and expressed hope that she may go home tomorrow or Saturday. She had no Restoration/spirtiual needs or concerns at this time. She shared she does not currently attend a Hoahaoism. Offered words of hope for continued healing and advised her of  availability. She noted the  telephone number on her white board.   Popeye Kiser, MPS, [facility], [address] [place] [address] Road Paging Service  [phone]

## 2018-01-25 NOTE — PROGRESS NOTES
Pharmacy Automatic Renal Dosing Protocol - Antimicrobials    Indication for Antimicrobials: ppx for leg erythema. SSTI  Per vascular surgery note: \"Add Bactrim DS and Indocin for leg erythema. \"  On , the patient had RIGHT FEMORAL-POPLITEAL BYPASS PTFE     Current Regimen of Each Antimicrobial:  Bactrim DS 1 tab every day  (Start Date ;; Day # 1)    Previous Antimicrobial Therapy:  None    Significant Cultures:   none    Paralysis, amputations, malnutrition: no    Labs:  Recent Labs      18   0402  18   0318  18   0255   CREA  0.51*  0.56  0.71   BUN  7  6  10   WBC  10.9  7.9  11.7*     Temp (24hrs), Av.1 °F (36.7 °C), Min:97.6 °F (36.4 °C), Max:98.7 °F (37.1 °C)      Creatinine Clearance (mL/min) or Dialysis: >60   No results found for: PCT    Impression/Plan:   Based on the current renal function, will adjust the Bactrim to BID  Please indicate the duration of treatment      Pharmacy will follow daily and adjust medications as appropriate for renal function and/or serum levels. Thank you,  Corazon Conde, PHARMD          Recommended duration of therapy  [email or web address]/Pharmacy/Clinical%20Companion/Duration%20of%20ABX%20therapy. docx    Renal Dosing  http://[facility]/[place]/[facility]/Pharmacy/Clinical%20Companion/Renal%[email or web address]. pdf

## 2018-01-25 NOTE — PROGRESS NOTES
Bedside shift change report given to Nam Gross RN (oncoming nurse) by Chuck Bardales RN (offgoing nurse). Report included the following information SBAR, Kardex, Intake/Output, Recent Results and Cardiac Rhythm NSR/Sinus Tach . 1945: Assessment complete. Patient resting comfortably in bed. Family at bedside. No complaints of pain or discomfort. SCDs placed on patient. Pulses dopplerable. Encouraged use of PCA. VSS. WIll monitor   0745: Uneventful night. Report given to [address] [place] [address].  Call bell in reach

## 2018-01-25 NOTE — PROGRESS NOTES
PCU SHIFT NURSING NOTE      Bedside shift change report given to Sakina Sofia (oncoming nurse) by Elle Miranda (offgoing nurse). Report included the following information SBAR, Kardex, Procedure Summary, Intake/Output, MAR and Recent Results. Shift Summary:   5796 - Tech performed Chlorhexidine bath for preop surgery today. 1140 - Pt taken to Preop via bed. Disconnected PCA and left pump in room. 1618 - Pt arrived to unit from PACU. A&Ox3. VSS. Right leg +doppler PP. Initiated prior PCA settings on pump. Voices no c/o pain at this time. Admission Date 1/22/2018   Admission Diagnosis BILATERAL ASO WITH CLAUDICATION   PVD (peripheral vascular disease) ([facility] [address]  BILATERAL ASO WITH CLAUDICATION    Consults IP CONSULT TO HOSPITALIST        Consults   []PT   []OT   []Speech   []Case Management      [] Palliative      Cardiac Monitoring Order   []Yes   []No     IV drips   []Yes    Drip:                            Dose:  Drip:                            Dose:  Drip:                            Dose:   []No     GI Prophylaxis   []Yes   []No         DVT Prophylaxis   SCDs:  Sequential Compression Device: Bilateral          Sarbjit stockings:  Graduated Compression Stockings: Bilateral      [] Medication   []Contraindicated   []None      Activity Level Activity Level: Bed Rest     Activity Assistance: Partial (one person)   Purposeful Rounding every 1-2 hour? []Yes   Tapia Score  Total Score: 3   Bed Alarm (If score 3 or >)   []Yes   [] Refused (See signed refusal form in chart)   Tayo Score  Tayo Score: 19   Tayo Score (if score 14 or less)   []PMT consult   []Wound Care consult      []Specialty bed   [] Nutrition consult          Needs prior to discharge:   Home O2 required:    []Yes   []No    If yes, how much O2 required?     Other:    Last Bowel Movement: Last Bowel Movement Date: 01/22/18      Influenza Vaccine Received Flu Vaccine for Current Season (usually Sept-March): No    Patient/Guardian Refused (Notify MD): Yes   Pneumonia Vaccine           Diet Active Orders   Diet    DIET CARDIAC      LDAs               Peripheral IV 01/22/18 Left Hand (Active)   Site Assessment Clean, dry, & intact 1/24/2018  3:00 PM   Phlebitis Assessment 0 1/24/2018  3:00 PM   Infiltration Assessment 0 1/24/2018  3:00 PM   Dressing Status Clean, dry, & intact 1/24/2018  3:00 PM   Dressing Type Tape;Transparent 1/24/2018  3:00 PM   Hub Color/Line Status Green; Infusing 1/24/2018  3:00 PM   Action Taken Open ports on tubing capped 1/24/2018  3:15 AM   Alcohol Cap Used Yes 1/24/2018  3:15 AM                      Urinary Catheter [REMOVED] Urinary Catheter 01/22/18 2- way; Saini-Criteria for Appropriate Use: Surgical procedure  Urinary Catheter 01/24/18 Saini-Criteria for Appropriate Use: Surgical procedure    Intake & Output   Date 01/23/18 1900 - 01/24/18 0659 01/24/18 0700 - 01/25/18 0659   Shift 4763-7229 24 Hour Total 6019-1477 8391-9336 24 Hour Total   I  N  T  A  K  E   I.V.  (mL/kg/hr)   1000  (1)  1000      Volume (lactated Ringers infusion)   1000  1000    Shift Total  (mL/kg)   1000  (12.3)  1000  (12.3)   O  U  T  P  U  T   Urine  (mL/kg/hr) 500 1550 275  (0.3)  275      Urine Voided 500 500         Urine Occurrence(s)  2 x         Urine Output   225  225      Urine Output (mL) ([REMOVED] Urinary Catheter 01/22/18 2- way; Saini)  1050         Urine Output (mL) (Urinary Catheter 01/24/18 Saini)   50  50    Blood   50  50      Estimated Blood Loss   50  50    Shift Total  (mL/kg) 500  (6.2) 1550  (19.1) 325  (4)  325  (4)   NET -500 -1550 675  675   Weight (kg) 81.3 81.3 81.3 81.3 81.3         Readmission Risk Assessment Tool Score Low Risk            5       Total Score        3 Has Seen PCP in Last 6 Months (Yes=3, No=0)    2 . Living with Significant Other. Assisted Living. LTAC. SNF. or   Rehab        Criteria that do not apply:    Patient Length of Stay (>5 days = 3)    IP Visits Last 12 Months (1-3=4, 4=9, >4=11)    Pt.  Coverage (Medicare=5 , Medicaid, or Self-Pay=4)    Charlson Comorbidity Score (Age + Comorbid Conditions)       Expected Length of Stay 4d 4h   Actual Length of Stay 2

## 2018-01-25 NOTE — PROGRESS NOTES
Hospitalist Progress Note    NAME: Greta Bailey   :  1970   MRN:  [id number]       Assessment / Plan:  HTN  cont nitropaste  PRN   Stop Metoprolol 2.5mg IV Q6H, start atenolol 50 mg q 12h, can go on home dose and schedule on discharge     Smoking  Counseled about quiting and she claims to already decided to quit      PAD  S/p Left Femoral Popliteal Bypass  and  Right Femoral Popliteal Bypass   Management as per Vascular surgery     Code Status: Full  DVT Prophylaxis: Per Vascular surgery      Body mass index is 30.77 kg/(m^2). Subjective:     Chief Complaint / Reason for Physician Visit  \"no c/o\". Discussed with RN events overnight. Review of Systems:  Symptom Y/N Comments  Symptom Y/N Comments   Fever/Chills n   Chest Pain n    Poor Appetite    Edema n    Cough n   Abdominal Pain n    Sputum n   Joint Pain n    SOB/SANTOS n   Pruritis/Rash     Nausea/vomit n   Tolerating PT/OT     Diarrhea n   Tolerating Diet     Constipation    Other       Could NOT obtain due to:      Objective:     VITALS:   Last 24hrs VS reviewed since prior progress note.  Most recent are:  Patient Vitals for the past 24 hrs:   Temp Pulse Resp BP SpO2   18 1219 97.9 °F (36.6 °C) (!) 103 18 123/77 99 %   18 0748 98.3 °F (36.8 °C) 96 16 116/81 99 %   18 0547 - 100 - 102/65 -   18 0358 98.1 °F (36.7 °C) 95 16 116/79 100 %   18 0042 - (!) 102 - 127/78 -   18 2320 98.2 °F (36.8 °C) 91 18 122/85 99 %   18 1907 97.8 °F (36.6 °C) 87 18 121/82 99 %   18 1619 - 87 17 109/64 -   18 1545 97.6 °F (36.4 °C) 82 12 107/81 93 %   18 1530 - 88 11 103/71 94 %   18 1515 - 84 17 108/75 97 %   18 1500 - 93 13 119/78 97 %   18 1455 - 95 16 (!) 121/92 97 %   18 1450 - 93 17 130/87 100 %   18 1447 98.7 °F (37.1 °C) 90 17 132/88 100 %   18 1445 - (!) 102 20 126/83 99 %   18 1444 - - - 132/88 -       Intake/Output Summary (Last 24 hours) at 01/25/18 1307  Last data filed at 01/25/18 9617   Gross per 24 hour   Intake             1000 ml   Output             4125 ml   Net            -3125 ml        PHYSICAL EXAM:  General: WD, WN. Alert, cooperative, no acute distress    EENT:  EOMI. Anicteric sclerae. MMM  Resp:  CTA bilaterally, no wheezing or rales. No accessory muscle use  CV:  Regular  rhythm,  No edema  GI:  Soft, Non distended, Non tender.  +Bowel sounds  Neurologic:  Alert and oriented X 3, normal speech, grossly intact  Psych:   Good insight. Not anxious nor agitated  Skin:  No rashes. No jaundice    Reviewed most current lab test results and cultures  YES  Reviewed most current radiology test results   YES  Review and summation of old records today    NO  Reviewed patient's current orders and MAR    YES  PMH/SH reviewed - no change compared to H&P  ________________________________________________________________________  Care Plan discussed with:    Comments   Patient x    Family      RN x    Care Manager     Consultant                        Multidiciplinary team rounds were held today with , nursing, pharmacist and clinical coordinator. Patient's plan of care was discussed; medications were reviewed and discharge planning was addressed. ________________________________________________________________________  Total NON critical care TIME:  30   Minutes    Total CRITICAL CARE TIME Spent:   Minutes non procedure based      Comments   >50% of visit spent in counseling and coordination of care     ________________________________________________________________________  Efren Valadez MD     Procedures: see electronic medical records for all procedures/Xrays and details which were not copied into this note but were reviewed prior to creation of Plan. LABS:  I reviewed today's most current labs and imaging studies.   Pertinent labs include:  Recent Labs      01/25/18   0402  01/24/18   0318  01/23/18   0255   WBC  10.9  7.9 11.7*   HGB  11.9  11.8  12.7   HCT  35.9  35.8  36.4   PLT  279  249  279     Recent Labs      01/25/18   0402  01/24/18   0318  01/23/18   0255   NA  136  137  135*   K  4.1  3.5  3.2*   CL  103  103  101   CO2  26  27  28   GLU  90  82  127*   BUN  7  6  10   CREA  0.51*  0.56  0.71   CA  8.6  8.4*  8.5   ALB   --    --   2.5*   TBILI   --    --   0.4   SGOT   --    --   17   ALT   --    --   16       Signed: Chino Chi MD

## 2018-01-26 VITALS
WEIGHT: 179.23 LBS | OXYGEN SATURATION: 99 % | SYSTOLIC BLOOD PRESSURE: 127 MMHG | HEIGHT: 64 IN | DIASTOLIC BLOOD PRESSURE: 86 MMHG | RESPIRATION RATE: 16 BRPM | BODY MASS INDEX: 30.6 KG/M2 | HEART RATE: 88 BPM | TEMPERATURE: 98 F

## 2018-01-26 LAB
ANION GAP SERPL CALC-SCNC: 8 MMOL/L (ref 5–15)
BUN SERPL-MCNC: 9 MG/DL (ref 6–20)
BUN/CREAT SERPL: 12 (ref 12–20)
CALCIUM SERPL-MCNC: 8.3 MG/DL (ref 8.5–10.1)
CHLORIDE SERPL-SCNC: 103 MMOL/L (ref 97–108)
CO2 SERPL-SCNC: 25 MMOL/L (ref 21–32)
CREAT SERPL-MCNC: 0.73 MG/DL (ref 0.55–1.02)
GLUCOSE SERPL-MCNC: 93 MG/DL (ref 65–100)
HCT VFR BLD AUTO: 36.3 % (ref 35–47)
HGB BLD-MCNC: 12.5 G/DL (ref 11.5–16)
POTASSIUM SERPL-SCNC: 4 MMOL/L (ref 3.5–5.1)
SODIUM SERPL-SCNC: 136 MMOL/L (ref 136–145)

## 2018-01-26 PROCEDURE — 74011250637 HC RX REV CODE- 250/637: Performed by: INTERNAL MEDICINE

## 2018-01-26 PROCEDURE — 74011250637 HC RX REV CODE- 250/637: Performed by: SURGERY

## 2018-01-26 PROCEDURE — 85018 HEMOGLOBIN: CPT | Performed by: NURSE PRACTITIONER

## 2018-01-26 PROCEDURE — 36415 COLL VENOUS BLD VENIPUNCTURE: CPT | Performed by: NURSE PRACTITIONER

## 2018-01-26 PROCEDURE — 80048 BASIC METABOLIC PNL TOTAL CA: CPT | Performed by: NURSE PRACTITIONER

## 2018-01-26 RX ORDER — SULFAMETHOXAZOLE AND TRIMETHOPRIM 800; 160 MG/1; MG/1
1 TABLET ORAL EVERY 12 HOURS
Qty: 18 TAB | Refills: 0 | Status: SHIPPED | OUTPATIENT
Start: 2018-01-26

## 2018-01-26 RX ORDER — ATENOLOL 100 MG/1
100 TABLET ORAL DAILY
Status: SHIPPED | COMMUNITY
Start: 2018-01-26

## 2018-01-26 RX ORDER — OXYCODONE AND ACETAMINOPHEN 5; 325 MG/1; MG/1
1-2 TABLET ORAL
Qty: 40 TAB | Refills: 0 | Status: SHIPPED | OUTPATIENT
Start: 2018-01-26

## 2018-01-26 RX ORDER — IBUPROFEN 200 MG
1 TABLET ORAL EVERY 24 HOURS
Qty: 30 PATCH | Refills: 0 | Status: SHIPPED | OUTPATIENT
Start: 2018-01-26 | End: 2018-02-25

## 2018-01-26 RX ORDER — ATENOLOL 50 MG/1
100 TABLET ORAL DAILY
Status: SHIPPED | COMMUNITY
Start: 2018-01-26 | End: 2018-01-26

## 2018-01-26 RX ORDER — SENNOSIDES 8.6 MG/1
2 TABLET ORAL
Qty: 60 TAB | Refills: 0 | Status: SHIPPED | OUTPATIENT
Start: 2018-01-26

## 2018-01-26 RX ORDER — NAPROXEN 250 MG/1
250 TABLET ORAL 2 TIMES DAILY WITH MEALS
Qty: 20 TAB | Refills: 0 | Status: SHIPPED | OUTPATIENT
Start: 2018-01-26

## 2018-01-26 RX ADMIN — CLOPIDOGREL BISULFATE 75 MG: 75 TABLET ORAL at 08:49

## 2018-01-26 RX ADMIN — INDOMETHACIN 75 MG: 75 CAPSULE, EXTENDED RELEASE ORAL at 08:49

## 2018-01-26 RX ADMIN — SULFAMETHOXAZOLE AND TRIMETHOPRIM 1 TABLET: 800; 160 TABLET ORAL at 08:49

## 2018-01-26 RX ADMIN — ATENOLOL 50 MG: 50 TABLET ORAL at 08:49

## 2018-01-26 NOTE — DISCHARGE SUMMARY
Vascular Surgery Discharge Summary     Patient ID:  Pal Tabor  [id number]  34 y.o.  1970    Admitting Provider: Arthur Moralez MD    Admit Date: 1/22/2018    Discharge Date: 1/26/2018    Discharge Diagnoses:    PVD (peripheral vascular disease) POA yes   Hypertensive disorder POA yes   Tobacco Use POA yes     Procedures:   LEFT FEMORAL-POPLITEAL BYPASS PTFE 01/22/2018  RIGHT FEMORAL-POPLITEAL BYPASS PTFE 01/24/2018      Hospital Course:   Ms. Merari Judd is a 57 yo WF with a pmhx significant for HTN. She is a daily smoker. She was admitted to the hospital s/p left fem to above the knee popliteal bypass graft w PTFE on 01/22/18. She is now s/p right fem to above the knee popliteal bypass graft w PTFE on 01/24/18. Her hospital course was complicated by erythema and edema of the left thigh which has improved prior to discharge. Patient was placed on oral antibiotics that continues at discharge. She was seen during admission by the hospitalist service for management of her hypertension. Her HCTZ was stopped and she continues on Atenolol 100mg oral daily. Smoking cessation education was completed and a nicotine patch was prescribed at discharge.      Pertinent Results:   Recent Results (from the past 24 hour(s))   HGB & HCT    Collection Time: 01/26/18  6:26 AM   Result Value Ref Range    HGB 12.5 11.5 - 16.0 g/dL    HCT 36.3 35.0 - 33.7 %   METABOLIC PANEL, BASIC    Collection Time: 01/26/18  6:26 AM   Result Value Ref Range    Sodium 136 136 - 145 mmol/L    Potassium 4.0 3.5 - 5.1 mmol/L    Chloride 103 97 - 108 mmol/L    CO2 25 21 - 32 mmol/L    Anion gap 8 5 - 15 mmol/L    Glucose 93 65 - 100 mg/dL    BUN 9 6 - 20 MG/DL    Creatinine 0.73 0.55 - 1.02 MG/DL    BUN/Creatinine ratio 12 12 - 20      GFR est AA >60 >60 ml/min/1.73m2    GFR est non-AA >60 >60 ml/min/1.73m2    Calcium 8.3 (L) 8.5 - 10.1 MG/DL         Results from Hospital Encounter encounter on 01/16/18   XR CHEST PA LAT   Narrative EXAM:  XR CHEST PA LAT    INDICATION:   pre-op for left femoral popliteal bypass. Hypertension. COMPARISON: None. FINDINGS: PA and lateral radiographs of the chest demonstrate clear lungs. The  cardiac and mediastinal contours and pulmonary vascularity are normal.  The  bones and soft tissues are within normal limits. Impression IMPRESSION: Normal chest.                Vital signs: Patient Vitals for the past 24 hrs:   BP Temp Pulse Resp SpO2   01/26/18 0703 127/86 98 °F (36.7 °C) 88 16 99 %   01/26/18 0622 118/84 98.1 °F (36.7 °C) 86 18 97 %   01/25/18 2332 113/75 98.2 °F (36.8 °C) 94 17 99 %   01/25/18 1952 130/69 98.1 °F (36.7 °C) 82 - 100 %   01/25/18 1723 126/69 98.2 °F (36.8 °C) (!) 106 18 99 %   01/25/18 1219 123/77 97.9 °F (36.6 °C) (!) 103 18 99 %       Patient Weight:   Last 3 Recorded Weights in this Encounter    01/22/18 0900   Weight: 81.3 kg (179 lb 3.7 oz)       Consults: Hospitalist    Patient Condition at Discharge: stable    Disposition: home    Patient Instructions:   Current Discharge Medication List      START taking these medications    Details   oxyCODONE-acetaminophen (PERCOCET) 5-325 mg per tablet Take 1-2 Tabs by mouth every four (4) hours as needed. Max Daily Amount: 12 Tabs. Qty: 40 Tab, Refills: 0    Associated Diagnoses: [facility]peripheral [facility] disease) [facility]      senna (SENOKOT) 8.6 mg tablet Take 2 Tabs by mouth nightly as needed for Constipation. Qty: 60 Tab, Refills: 0      trimethoprim-sulfamethoxazole (BACTRIM DS, SEPTRA DS) 160-800 mg per tablet Take 1 Tab by mouth every twelve (12) hours. Qty: 18 Tab, Refills: 0      Naprosyn 250mg 1 tab oral twice a day with meals x 10 days    Nicotine patch 14mg transdermal daily      CONTINUE these medications which have CHANGED    Details   atenolol (TENORMIN) 100 mg tablet Take 1 Tab by mouth daily.          CONTINUE these medications which have NOT CHANGED    Details   potassium chloride SR (KLOR-CON 10) 10 mEq tablet Take 20 mEq by mouth two (2) times a day. CILOSTAZOL (PLETAL PO) Take  by mouth. clopidogrel (PLAVIX) 75 mg tab Take 75 mg by mouth daily. STOP taking these medications       atenolol-chlorthalidone (TENORETIC) 100-25 mg per tablet Comments:   Reason for Stopping:               Diet: Cardiac Diet  Activity/Wound Care: Keep wound clean and dry    Patient may shower. Dry the wound carefully. The dressing can be removed if there is no drainage, or changed and kept in place for comfort. Patient should not attempt to drive a car until they are comfortable and NOT taking pain medication. No heavy lifting (7 lbs limit). Mild exercise and light duties around the house are OK. Call the office at [phone] if there are any problems.       Follow-up Information     Follow up With Details Comments Contact Info    Lael Leyden, MD  Your appointment is scheduled for Feb 2nd at [address] [facility] [place]      Michell Carmona MD In 3 weeks Your appointment is scheduled for Feb 15th at 0900am at the [address] [place] [address] office--[address]  [phone]            Signed:  Jena Rosario NP  1/26/2018  9:42 AM

## 2018-01-26 NOTE — PROGRESS NOTES
PCU SHIFT NURSING NOTE      Bedside and Verbal shift change report given to Catina Martin (oncoming nurse) by Sushil Lucas (offgoing nurse). Report included the following information SBAR, Kardex, Procedure Summary, Intake/Output, MAR and Recent Results. Shift Summary:       2100: Patient resting in bed, no complaints at this time      Admission Date 1/22/2018   Admission Diagnosis BILATERAL ASO WITH CLAUDICATION   PVD (peripheral vascular disease) ([facility] [address]  BILATERAL ASO WITH CLAUDICATION    Consults IP CONSULT TO HOSPITALIST        Consults   []PT   []OT   []Speech   []Case Management      [] Palliative      Cardiac Monitoring Order   []Yes   []No     IV drips   []Yes    Drip:                            Dose:  Drip:                            Dose:  Drip:                            Dose:   []No     GI Prophylaxis   []Yes   []No         DVT Prophylaxis   SCDs:  Sequential Compression Device: Bilateral          Sarbjit stockings:  Graduated Compression Stockings: Bilateral      [] Medication   []Contraindicated   []None      Activity Level Activity Level: Up with Assistance     Activity Assistance: Partial (one person)   Purposeful Rounding every 1-2 hour? []Yes   Tapia Score  Total Score: 3   Bed Alarm (If score 3 or >)   []Yes   [] Refused (See signed refusal form in chart)   Tayo Score  Tayo Score: 19   Tayo Score (if score 14 or less)   []PMT consult   []Wound Care consult      []Specialty bed   [] Nutrition consult          Needs prior to discharge:   Home O2 required:    []Yes   []No    If yes, how much O2 required?     Other:    Last Bowel Movement: Last Bowel Movement Date: 01/22/18      Influenza Vaccine Received Flu Vaccine for Current Season (usually Sept-March): No    Patient/Guardian Refused (Notify MD): Yes   Pneumonia Vaccine           Diet Active Orders   Diet    DIET CARDIAC      LDAs               Peripheral IV 01/22/18 Left Hand (Active)   Site Assessment Clean, dry, & intact 1/25/2018  2:45 PM Phlebitis Assessment 0 1/25/2018  2:45 PM   Infiltration Assessment 0 1/25/2018  2:45 PM   Dressing Status Intact;Dry 1/25/2018  2:45 PM   Dressing Type Tape;Transparent 1/25/2018  2:45 PM   Hub Color/Line Status Green; Infusing 1/25/2018  2:45 PM   Action Taken Open ports on tubing capped 1/25/2018  3:58 AM   Alcohol Cap Used Yes 1/25/2018  3:58 AM                      Urinary Catheter [REMOVED] Urinary Catheter 01/22/18 2- way; Saini-Criteria for Appropriate Use: Surgical procedure  [REMOVED] Urinary Catheter 01/24/18 Saini-Criteria for Appropriate Use: Surgical procedure    Intake & Output   Date 01/24/18 1900 - 01/25/18 0659 01/25/18 0700 - 01/26/18 0659   Shift 7899-3160 24 Hour Total 1364-3342 8252-8309 24 Hour Total   I  N  T  A  K  E   P.O.   840  840      P. O.   840  840    I.V.  (mL/kg/hr) 0 1000 134.6  (0.1)  134.6      Volume (lactated Ringers infusion)  1000         Volume (0.45% sodium chloride infusion) 0 0 134.6  134.6    Shift Total  (mL/kg) 0  (0) 1000  (12.3) 974.6  (12)  974.6  (12)   O  U  T  P  U  T   Urine  (mL/kg/hr) 2800 3075 1300  (1.3)  1300      Urine Voided   300  300      Urine Output  225         Urine Output (mL) ([REMOVED] Urinary Catheter 01/24/18 Saini) 2800 2850 1000  1000    Blood  50         Estimated Blood Loss  50       Shift Total  (mL/kg) 2800  (34.4) 3125  (38.4) 1300  (16)  1300  (16)   NET -2800 -2125 -325.4  -325.4   Weight (kg) 81.3 81.3 81.3 81.3 81.3         Readmission Risk Assessment Tool Score Low Risk            5       Total Score        3 Has Seen PCP in Last 6 Months (Yes=3, No=0)    2 . Living with Significant Other. Assisted Living. LTAC. SNF. or   Rehab        Criteria that do not apply:    Patient Length of Stay (>5 days = 3)    IP Visits Last 12 Months (1-3=4, 4=9, >4=11)    Pt.  Coverage (Medicare=5 , Medicaid, or Self-Pay=4)    Charlson Comorbidity Score (Age + Comorbid Conditions)       Expected Length of Stay 4d 4h   Actual Length of Stay 3

## 2018-01-26 NOTE — PROGRESS NOTES
PCU SHIFT NURSING NOTE      Bedside shift change report given to [facility] (oncoming nurse) by Anabelle Burton (offgoing nurse). Report included the following information SBAR, Kardex, Procedure Summary and Intake/Output. Shift Summary:  0830 - Dr. Pawel Fair in to see pt. Assessed left leg swelling/redness. MD assured patient that the numbness was not unusual, but would start her on an antibiotic and antiinflammatory. 1025 - Pt requested to stop PCA. At this time, surgical NP stated that pt was considered POD 1 again since right side revision was performed. Advised patient to leave PCA attached and use as needed if percocet does not give relief. 1156 - Pt ambulating in room without difficulty. Assisted pt up to recliner. No c/o at this time. 1728 - Pt again requested to stop PCA since she had not used it all day. Agreed with pt at this time to discontinue PCA use and waste contents with second RN.  8287 - Pt assisted back to bed. No c/o pain at this time. VSS. Admission Date 1/22/2018   Admission Diagnosis BILATERAL ASO WITH CLAUDICATION   PVD (peripheral vascular disease) ([facility] [address]  BILATERAL ASO WITH CLAUDICATION    Consults IP CONSULT TO HOSPITALIST        Consults   []PT   []OT   []Speech   []Case Management      [] Palliative      Cardiac Monitoring Order   []Yes   []No     IV drips   []Yes    Drip:                            Dose:  Drip:                            Dose:  Drip:                            Dose:   []No     GI Prophylaxis   []Yes   []No         DVT Prophylaxis   SCDs:  Sequential Compression Device: Bilateral          Sarbjit stockings:  Graduated Compression Stockings: Bilateral      [] Medication   []Contraindicated   []None      Activity Level Activity Level: Up with Assistance     Activity Assistance: Partial (one person)   Purposeful Rounding every 1-2 hour?    []Yes   Tapia Score  Total Score: 3   Bed Alarm (If score 3 or >)   []Yes   [] Refused (See signed refusal form in chart)   Tayo Score  Tayo Score: 19   Tayo Score (if score 14 or less)   []PMT consult   []Wound Care consult      []Specialty bed   [] Nutrition consult          Needs prior to discharge:   Home O2 required:    []Yes   []No    If yes, how much O2 required? Other:    Last Bowel Movement: Last Bowel Movement Date: 01/22/18      Influenza Vaccine Received Flu Vaccine for Current Season (usually Sept-March): No    Patient/Guardian Refused (Notify MD): Yes   Pneumonia Vaccine           Diet Active Orders   Diet    DIET CARDIAC      LDAs               Peripheral IV 01/22/18 Left Hand (Active)   Site Assessment Clean, dry, & intact 1/25/2018  2:45 PM   Phlebitis Assessment 0 1/25/2018  2:45 PM   Infiltration Assessment 0 1/25/2018  2:45 PM   Dressing Status Intact;Dry 1/25/2018  2:45 PM   Dressing Type Tape;Transparent 1/25/2018  2:45 PM   Hub Color/Line Status Green; Infusing 1/25/2018  2:45 PM   Action Taken Open ports on tubing capped 1/25/2018  3:58 AM   Alcohol Cap Used Yes 1/25/2018  3:58 AM                      Urinary Catheter [REMOVED] Urinary Catheter 01/22/18 2- way; Saini-Criteria for Appropriate Use: Surgical procedure  [REMOVED] Urinary Catheter 01/24/18 Saini-Criteria for Appropriate Use: Surgical procedure    Intake & Output   Date 01/24/18 1900 - 01/25/18 0659 01/25/18 0700 - 01/26/18 0659   Shift 8580-9485 24 Hour Total 5883-5230 3201-4118 24 Hour Total   I  N  T  A  K  E   P. O.   600  600      P. O.   600  600    I.V.  (mL/kg/hr) 0 1000 134.6  (0.1)  134.6      Volume (lactated Ringers infusion)  1000         Volume (0.45% sodium chloride infusion) 0 0 134.6  134.6    Shift Total  (mL/kg) 0  (0) 1000  (12.3) 734.6  (9)  734.6  (9)   O  U  T  P  U  T   Urine  (mL/kg/hr) 2800 3075 1300  (1.3)  1300      Urine Voided   300  300      Urine Output  225         Urine Output (mL) ([REMOVED] Urinary Catheter 01/24/18 Saini) 2800 2850 1000  1000    Blood  50         Estimated Blood Loss  50       Shift Total  (mL/kg) 2800  (34.4) 3125  (38.4) 1300  (16)  1300  (16)   NET -2800 -2125 -565.4  -565.4   Weight (kg) 81.3 81.3 81.3 81.3 81.3         Readmission Risk Assessment Tool Score Low Risk            5       Total Score        3 Has Seen PCP in Last 6 Months (Yes=3, No=0)    2 . Living with Significant Other. Assisted Living. LTAC. SNF. or   Rehab        Criteria that do not apply:    Patient Length of Stay (>5 days = 3)    IP Visits Last 12 Months (1-3=4, 4=9, >4=11)    Pt.  Coverage (Medicare=5 , Medicaid, or Self-Pay=4)    Charlson Comorbidity Score (Age + Comorbid Conditions)       Expected Length of Stay 4d 4h   Actual Length of Stay 3

## 2018-01-26 NOTE — PROGRESS NOTES
PCU SHIFT NURSING NOTE      Bedside shift change report given to [address] rn (oncoming nurse) by latrice RN (offgoing nurse). Report included the following information SBAR, Kardex, Intake/Output and MAR. Shift Summary: [id number]- Dr. Ekaterina Viera into round with patient. 1150- Discharge instructions given and reviewed with patient, questions and concerns addressed. PIV and telemetry removed. Prescriptions given. Patient denies questions or concerns. Palpable pedal pulses noted incisions clean and intact. None noted. Volunteer services into take patient to front entrance via wheelchair. where family awaiting to pick patient up      Admission Date 1/22/2018   Admission Diagnosis BILATERAL ASO WITH CLAUDICATION   PVD (peripheral vascular disease) ([facility] [address]  BILATERAL ASO WITH CLAUDICATION    Consults IP CONSULT TO HOSPITALIST        Consults   []PT   []OT   []Speech   []Case Management      [] Palliative      Cardiac Monitoring Order   []Yes   []No     IV drips   []Yes    Drip:                            Dose:  Drip:                            Dose:  Drip:                            Dose:   []No     GI Prophylaxis   []Yes   []No         DVT Prophylaxis   SCDs:  Sequential Compression Device: Bilateral          Sarbjit stockings:  Graduated Compression Stockings: Bilateral      [] Medication   []Contraindicated   []None      Activity Level Activity Level: Up ad casimiro     Activity Assistance: No assistance needed   Purposeful Rounding every 1-2 hour? []Yes   Tapia Score  Total Score: 1   Bed Alarm (If score 3 or >)   []Yes   [] Refused (See signed refusal form in chart)   Tayo Score  Tayo Score: 19   Tayo Score (if score 14 or less)   []PMT consult   []Wound Care consult      []Specialty bed   [] Nutrition consult          Needs prior to discharge:   Home O2 required:    []Yes   []No    If yes, how much O2 required?     Other:    Last Bowel Movement: Last Bowel Movement Date: 01/22/18      Influenza Vaccine Received Flu Vaccine for Current Season (usually Sept-March): No    Patient/Guardian Refused (Notify MD): Yes   Pneumonia Vaccine           Diet Active Orders   Diet    DIET CARDIAC      LDAs               Peripheral IV 01/22/18 Left Hand (Active)   Site Assessment Clean, dry, & intact 1/26/2018  2:55 AM   Phlebitis Assessment 0 1/26/2018  2:55 AM   Infiltration Assessment 0 1/26/2018  2:55 AM   Dressing Status Clean, dry, & intact 1/26/2018  2:55 AM   Dressing Type Tape;Transparent 1/26/2018  2:55 AM   Hub Color/Line Status Green;Flushed 1/26/2018  2:55 AM   Action Taken Open ports on tubing capped 1/25/2018  3:58 AM   Alcohol Cap Used Yes 1/25/2018  3:58 AM                      Urinary Catheter [REMOVED] Urinary Catheter 01/22/18 2- way; Saini-Criteria for Appropriate Use: Surgical procedure  [REMOVED] Urinary Catheter 01/24/18 Saini-Criteria for Appropriate Use: Surgical procedure    Intake & Output   Date 01/25/18 0700 - 01/26/18 0659 01/26/18 0700 - 01/27/18 0659   Shift 3233-4619 2532-3178 24 Hour Total 1258-1181 3217-8593 24 Hour Total   I  N  T  A  K  E   P.O.          P. O.        I.V.  (mL/kg/hr) 134.6  (0.1)  134.6  (0.1)         Volume (0.45% sodium chloride infusion) 134.6  134.6       Shift Total  (mL/kg) 974.6  (12) 390  (4.8) 1364.6  (16.8)      O  U  T  P  U  T   Urine  (mL/kg/hr) 1300  (1.3)  1300  (0.7)         Urine Voided 300  300         Urine Occurrence(s)  2 x 2 x         Urine Output (mL) ([REMOVED] Urinary Catheter 01/24/18 Saini) 1000  1000       Shift Total  (mL/kg) 1300  (16)  1300  (16)      NET -325. 4 390 64.6      Weight (kg) 81.3 81.3 81.3 81.3 81.3 81.3         Readmission Risk Assessment Tool Score Low Risk            5       Total Score        3 Has Seen PCP in Last 6 Months (Yes=3, No=0)    2 . Living with Significant Other. Assisted Living. LTAC. SNF.  or   Rehab        Criteria that do not apply:    Patient Length of Stay (>5 days = 3)    IP Visits Last 12 Months (1-3=4, 4=9, >4=11)    Pt.  Coverage (Medicare=5 , Medicaid, or Self-Pay=4)    Charlson Comorbidity Score (Age + Comorbid Conditions)       Expected Length of Stay 4d 4h   Actual Length of Stay 4

## 2018-01-26 NOTE — PROGRESS NOTES
Hospitalist Progress Note    NAME: Yasmin Clemons   :  1970   MRN:  [id number]       Assessment / Plan:    HTN  -BP and HR good on atenolol 100mg daily PO. This is her home dose so she can just go back on it. I asked her to stop the HCTZ. She will follow up with her PCP      Smoking  -she has decided to quit       she looks stable for discharge from my end. Will sign off. Code Status: Full  DVT Prophylaxis: Per Vascular surgery      Body mass index is 30.77 kg/(m^2). Subjective:     Chief Complaint / Reason for Physician Visit  Follow up HTN    Review of Systems:  Symptom Y/N Comments  Symptom Y/N Comments   Fever/Chills n   Chest Pain n    Poor Appetite    Edema n    Cough n   Abdominal Pain n    Sputum n   Joint Pain n    SOB/SANTOS n   Pruritis/Rash     Nausea/vomit n   Tolerating PT/OT     Diarrhea n   Tolerating Diet     Constipation    Other       Could NOT obtain due to:      Objective:     VITALS:   Last 24hrs VS reviewed since prior progress note. Most recent are:  Patient Vitals for the past 24 hrs:   Temp Pulse Resp BP SpO2   18 0703 98 °F (36.7 °C) 88 16 127/86 99 %   18 0622 98.1 °F (36.7 °C) 86 18 118/84 97 %   18 2332 98.2 °F (36.8 °C) 94 17 113/75 99 %   18 1952 98.1 °F (36.7 °C) 82 - 130/69 100 %   18 1723 98.2 °F (36.8 °C) (!) 106 18 126/69 99 %   18 1219 97.9 °F (36.6 °C) (!) 103 18 123/77 99 %       Intake/Output Summary (Last 24 hours) at 18 0841  Last data filed at 18 0255   Gross per 24 hour   Intake          1124.58 ml   Output             1300 ml   Net          -175.42 ml        PHYSICAL EXAM:  General: WD, WN. Alert, cooperative, no acute distress    EENT:  EOMI. Anicteric sclerae. MMM  Resp:  CTA bilaterally, no wheezing or rales.   No accessory muscle use  CV:  Regular  rhythm,  No edema  GI:  Soft, Non distended, Non tender.  +Bowel sounds  Neurologic:  Alert and oriented X 3, normal speech, grossly intact  Psych:   Good insight. Not anxious nor agitated  Skin:  No rashes. No jaundice    Reviewed most current lab test results and cultures  YES  Reviewed most current radiology test results   YES  Review and summation of old records today    NO  Reviewed patient's current orders and MAR    YES  PMH/SH reviewed - no change compared to H&P  ________________________________________________________________________  Care Plan discussed with:    Comments   Patient x    Family      RN x    Care Manager     Consultant                        Multidiciplinary team rounds were held today with , nursing, pharmacist and clinical coordinator. Patient's plan of care was discussed; medications were reviewed and discharge planning was addressed. ________________________________________________________________________  Total NON critical care TIME:  10   Minutes    Total CRITICAL CARE TIME Spent:   Minutes non procedure based      Comments   >50% of visit spent in counseling and coordination of care     ________________________________________________________________________  Shen Guaman MD     Procedures: see electronic medical records for all procedures/Xrays and details which were not copied into this note but were reviewed prior to creation of Plan. LABS:  I reviewed today's most current labs and imaging studies.   Pertinent labs include:  Recent Labs      01/26/18 0626 01/25/18 0402 01/24/18 0318   WBC   --   10.9  7.9   HGB  12.5  11.9  11.8   HCT  36.3  35.9  35.8   PLT   --   279  249     Recent Labs      01/26/18   0626 01/25/18   0402  01/24/18   0318   NA  136  136  137   K  4.0  4.1  3.5   CL  103  103  103   CO2  25  26  27   GLU  93  90  82   BUN  9  7  6   CREA  0.73  0.51*  0.56   CA  8.3*  8.6  8.4*       Signed: Shen Guaman MD

## 2023-08-15 NOTE — PROGRESS NOTES
Chief Complaint   Patient presents with    Rash and Discharge        HISTORY OF PRESENT ILLNESS:   Carolina Knight is a 69 y.o. female  who presents for annual.  No LMP recorded (lmp unknown). Patient has had a hysterectomy.. Menopause at age  43 2/2 [place]O/LSO due to endometrosis  .  She has complains of vaginal dryness and and recurrent yeast however yeast swab is negative.  We tried patch but wasn't covered by her insurance so then tried estrogen pill but she stopped that and is doing ok without it. She has been having rashes all over and recurrent ring worm. Would like jury duty note due to ring worm.      Past Medical History:   Diagnosis Date    Decreased mobility and endurance 2017    Decreased strength 2017    Decreased strength of lower extremity 2021    Difficult intubation     Early dry stage nonexudative age-related macular degeneration     Edema 2018    Edema of both legs 2020    Fatty liver 12/15/2014    Hypokalemia 2018    Lymphedema of both lower extremities 2020    Multiple gastric polyps 2012    Polyarthralgia 2015    Sjogren's disease     Sleep apnea           Past Surgical History:   Procedure Laterality Date    BREAST BIOPSY Left 2017    core,Benign breast with fibrocystic changes, including benign usual ductal hyperplasia and adenosis    BREAST BIOPSY Right     core    CERVICAL CONIZATION   W/ LASER       SECTION      x1    CHOLECYSTECTOMY      ESOPHAGOGASTRODUODENOSCOPY N/A 2018    Procedure: ESOPHAGOGASTRODUODENOSCOPY (EGD);  Surgeon: George Henriquez MD;  Location: [address]);  Service: Endoscopy;  Laterality: N/A;  possible difficult intubation - see anesthesia note dated 1/15/13 - 2nd floor/ generated from last EGD, 3 year f/u, Pt due/ Pt requesting Dr. Henriquez -     EYE SURGERY  Est 2014-15    Laser on each eye - tear in white part eye by Dr Nitish Ayala    HYSTERECTOMY  age 43    OR[place]O, LSO (uterine fibroids,  Maurice Wang NP reviewed Urine culture. No orders at this time. adhesions, endometriosis)    OOPHORECTOMY  age 43    LSO (endometriosis, cyst)    PERCUTANEOUS CRYOTHERAPY OF PERIPHERAL NERVE USING LIQUID NITROUS OXIDE IN CLOSED NEEDLE DEVICE Right 03/01/2021    Procedure: CRYOTHERAPY, NERVE, PERIPHERAL, PERCUTANEOUS, USING LIQUID NITROUS OXIDE IN CLOSED NEEDLE DEVICE;  Surgeon: CHACE Riley;  Location: [facility];  Service: Pain Management;  Laterality: Right;  RIGHT KNEE IOVERA    THYROIDECTOMY      TOE SURGERY      left    TOTAL REPLACEMENT OF BOTH KNEES USING COMPUTER-ASSISTED NAVIGATION  2023    TOTAL THYROIDECTOMY           Social History     Socioeconomic History    Marital status:      Spouse name: Nitish    Number of children: 1   Occupational History    Occupation:      Comment: retired   Tobacco Use    Smoking status: Never    Smokeless tobacco: Never   Substance and Sexual Activity    Alcohol use: No     Comment: none lately.  rarely has a drink for special occassion    Drug use: No    Sexual activity: Not Currently     Partners: Male     Birth control/protection: Post-menopausal, None   Social History Narrative    House- has stairs     Social Determinants of Health     Financial Resource Strain: Low Risk  (8/8/2023)    Overall Financial Resource Strain (CARDIA)     Difficulty of Paying Living Expenses: Not hard at all   Food Insecurity: No Food Insecurity (8/8/2023)    Hunger Vital Sign     Worried About Running Out of Food in the Last Year: Never true     Ran Out of Food in the Last Year: Never true   Transportation Needs: No Transportation Needs (8/8/2023)    PRAPARE - Transportation     Lack of Transportation (Medical): No     Lack of Transportation (Non-Medical): No   Physical Activity: Insufficiently Active (8/8/2023)    Exercise Vital Sign     Days of Exercise per Week: 2 days     Minutes of Exercise per Session: 30 min   Stress: No Stress Concern Present (8/8/2023)    [place] [employer] - Occupational  Stress Questionnaire     Feeling of Stress : Not at all   Recent Concern: Stress - Stress Concern Present (2023)    [place] [place] [employer] - Occupational Stress Questionnaire     Feeling of Stress : Rather much   Social Connections: Socially Integrated (2023)    Social Connection and Isolation Panel [NHANES]     Frequency of Communication with Friends and Family: More than three times a week     Frequency of Social Gatherings with Friends and Family: More than three times a week     Attends Quaker Services: More than 4 times per year     Active Member of Clubs or Organizations: Yes     Attends Club or Organization Meetings: More than 4 times per year     Marital Status:    Housing Stability: Low Risk  (2023)    Housing Stability Vital Sign     Unable to Pay for Housing in the Last Year: No     Number of Places Lived in the Last Year: 1     Unstable Housing in the Last Year: No       Family History   Problem Relation Age of Onset    Arthritis Mother     Asthma Father     COPD Father     COPD Sister     Melanoma Sister     Melanoma Brother     Diabetes Brother         Adult onset got in his 60s    Asthma Son     Breast cancer Maternal Aunt     Cancer Maternal Aunt     Cancer Maternal Uncle         esophageal cancer    Ovarian cancer Neg Hx     Colon cancer Neg Hx          OB History    Para Term  AB Living   1 1 1     1   SAB IAB Ectopic Multiple Live Births           1      # Outcome Date GA Lbr Eric/2nd Weight Sex Delivery Anes PTL Lv   1 Term 84    M CS-LTranv  N BURT       COMPREHENSIVE GYN HISTORY:  PAP History: had CKC in  but normal paps since;  NILM;  NILM   Infection History: Denies STDs. Denies PID.  Benign History: had uterine fibroids. Denies ovarian cysts. had endometriosis Denies other conditions.  Cancer History: Denies cervical cancer. Denies uterine cancer or hyperplasia. Denies ovarian cancer. Denies vulvar cancer or pre-cancer.  Denies vaginal cancer or pre-cancer. Denies breast cancer. Denies colon cancer.  Cycle: normal until had hyst at 43 2/2 endo and an enlarging uterus due to fibroids          BP (!) 148/82   Wt 118.1 kg (260 lb 5.8 oz)   LMP  (LMP Unknown)   BMI 42.02 kg/m²     APPEARANCE: Well nourished, well developed, in no acute distress.  NECK: Neck symmetric   BREASTS: Symmetrical, no skin changes or visible lesions. No palpable masses, nipple discharge or adenopathy bilaterally.  PELVIC: erythema in crease of thighs  VULVA: No lesions. Normal female genitalia.  URETHRAL MEATUS: Normal size and location, no lesions, no prolapse.  URETHRA: No masses, tenderness, prolapse or scarring.  VAGINA: atrophic, at introitus 2 small 2-3 mm plaques on left side, no discharge, no significant cystocele or rectocele. Well healed cuff   CERVIX: surgically absent  UTERUS: surgically absent   ADNEXA: No masses or tenderness.  PERINEUM: Normal, mo masses, small internal hemorrhoid     Data Reviewed:     Last MMG: Date: 9/2022 BIRADS 1  Lipid profile: Date:   Lab Results   Component Value Date    CHOL 165 03/15/2023    CHOL 147 05/10/2022    CHOL 164 01/25/2022     Lab Results   Component Value Date    HDL 55 03/15/2023    HDL 59 05/10/2022    HDL 58 01/25/2022     Lab Results   Component Value Date    LDLCALC 85.4 03/15/2023    LDLCALC 69.8 05/10/2022    LDLCALC 87.8 01/25/2022     Lab Results   Component Value Date    TRIG 123 03/15/2023    TRIG 91 05/10/2022    TRIG 91 01/25/2022     Lab Results   Component Value Date    CHOLHDL 33.3 03/15/2023    CHOLHDL 40.1 05/10/2022    CHOLHDL 35.4 01/25/2022     TSH:   Lab Results   Component Value Date    TSH 1.234 05/17/2023     Colonoscopy Date: 11/2010  DXA: 2020 osteopenia     08/15/2023  Procedure: vagina punch biopsy   Complications: none   Condition stable: EBL: 1cc  Procedure: area cleansed with iodine and area on left introtus numbed with 1cc of lidocaine with epi and 3mm punch biopsy done  and sample sent to pathology. Site made hemostatic with silver nitrate.       1. Encounter for pelvic screening for cancer    2. Pap smear for cervical cancer screening    3. Discolored skin    4. Vaginal lesion          A/P 1. Pap smear 2020 negative; she would like to continue to do them  2. Following with PCP for screening colonoscopy and dexa   3. Erythema in thighs appears more like contact/irritation than really yeast, discussed to have dermatology look at it today when she goes  -discussed that yeast/ring worm is not likely going to be an accepted accuse for getting out of jury duty but will discuss with derm today   4. Biopsy done on area, possible lichen planus    =

## (undated) DEVICE — ASTOUND STANDARD SURGICAL GOWN, XL: Brand: CONVERTORS

## (undated) DEVICE — SOLUTION IV 500ML 0.9% SOD CHL FLX CONT

## (undated) DEVICE — SUTURE PROL 5-0 L18IN NONABSORBABLE BLU RB-2 L13MM 1/2 CIR 8713H

## (undated) DEVICE — REM POLYHESIVE ADULT PATIENT RETURN ELECTRODE: Brand: VALLEYLAB

## (undated) DEVICE — STERILE POLYISOPRENE POWDER-FREE SURGICAL GLOVES: Brand: PROTEXIS

## (undated) DEVICE — SUTURE VCRL 2-0 XLH 27IN ABSRB BRAID VLT J581G

## (undated) DEVICE — SUTURE PROL SZ 5-0 L24IN NONABSORBABLE BLU RB-2 L13IN 1/2 8554H

## (undated) DEVICE — SOLUTION IV 1000ML 0.9% SOD CHL

## (undated) DEVICE — SYR 3ML LL TIP 1/10ML GRAD --

## (undated) DEVICE — HANDLE LT SNAP ON ULT DURABLE LENS FOR TRUMPF ALC DISPOSABLE

## (undated) DEVICE — SUTURE VCRL SZ 3-0 L27IN ABSRB UD L24MM PS-1 3/8 CIR PRIM J936H

## (undated) DEVICE — APPLICATOR FBR TIP L6IN COT TIP WOOD SHFT SWAB 2000 PER CA

## (undated) DEVICE — VASCULAR-RICHMOND-LF: Brand: MEDLINE INDUSTRIES, INC.

## (undated) DEVICE — LUER-LOK 360°: Brand: CONNECTA, LUER-LOK

## (undated) DEVICE — NEEDLE BLD COLL 22GA L1.25IN BLK W/OUT HNG PREATTACH HLDR

## (undated) DEVICE — TRAY CATH 16F DRN BG LTX -- CONVERT TO ITEM 363158

## (undated) DEVICE — SUT PROL 6-0 18IN RB2 DA BLU --

## (undated) DEVICE — (D)PREP SKN CHLRAPRP APPL 26ML -- CONVERT TO ITEM 371833

## (undated) DEVICE — SUTURE NONABSORBABLE MONOFILAMENT 4-0 CV-5 PT-13 24 IN GORTX 5K08B

## (undated) DEVICE — X-RAY SPONGES,16 PLY: Brand: DERMACEA

## (undated) DEVICE — INFECTION CONTROL KIT SYS

## (undated) DEVICE — ZINACTIVE USE 2641837 CLIP LIG M BLU TI HRT SHP WIRE HORZ 600 PER BX

## (undated) DEVICE — AGENT HEMSTAT W4XL4IN OXIDIZED REGENERATED CELOS ABSRB SFT

## (undated) DEVICE — DERMABOND SKIN ADH 0.7ML -- DERMABOND ADVANCED 12/BX

## (undated) DEVICE — 1200 GUARD II KIT W/5MM TUBE W/O VAC TUBE: Brand: GUARDIAN

## (undated) DEVICE — DEVON™ KNEE AND BODY STRAP 60" X 3" (1.5 M X 7.6 CM): Brand: DEVON

## (undated) DEVICE — SUT ETHLN 3-0 18IN PS1 BLK --